# Patient Record
Sex: FEMALE | Race: WHITE | NOT HISPANIC OR LATINO | Employment: OTHER | URBAN - METROPOLITAN AREA
[De-identification: names, ages, dates, MRNs, and addresses within clinical notes are randomized per-mention and may not be internally consistent; named-entity substitution may affect disease eponyms.]

---

## 2023-06-22 LAB — URINE CULTURE: ABNORMAL

## 2024-04-19 ENCOUNTER — HOSPITAL ENCOUNTER (INPATIENT)
Facility: HOSPITAL | Age: 89
LOS: 2 days | Discharge: HOME HEALTH | DRG: 592 | End: 2024-04-24
Attending: STUDENT IN AN ORGANIZED HEALTH CARE EDUCATION/TRAINING PROGRAM | Admitting: INTERNAL MEDICINE
Payer: COMMERCIAL

## 2024-04-19 ENCOUNTER — APPOINTMENT (OUTPATIENT)
Dept: RADIOLOGY | Facility: HOSPITAL | Age: 89
DRG: 592 | End: 2024-04-19
Payer: COMMERCIAL

## 2024-04-19 DIAGNOSIS — L97.521 NON-PRESSURE CHRONIC ULCER OF OTHER PART OF LEFT FOOT LIMITED TO BREAKDOWN OF SKIN (MULTI): ICD-10-CM

## 2024-04-19 DIAGNOSIS — I50.1: ICD-10-CM

## 2024-04-19 DIAGNOSIS — J81.0 ACUTE PULMONARY EDEMA (MULTI): ICD-10-CM

## 2024-04-19 DIAGNOSIS — J90 PLEURAL EFFUSION, NOT ELSEWHERE CLASSIFIED: ICD-10-CM

## 2024-04-19 DIAGNOSIS — L03.119 CELLULITIS OF FOOT: Primary | ICD-10-CM

## 2024-04-19 DIAGNOSIS — R79.89 ELEVATED BRAIN NATRIURETIC PEPTIDE (BNP) LEVEL: ICD-10-CM

## 2024-04-19 LAB
ALBUMIN SERPL BCP-MCNC: 2.4 G/DL (ref 3.4–5)
ALP SERPL-CCNC: 130 U/L (ref 33–136)
ALT SERPL W P-5'-P-CCNC: 16 U/L (ref 7–45)
ANION GAP SERPL CALC-SCNC: 15 MMOL/L (ref 10–20)
AST SERPL W P-5'-P-CCNC: 25 U/L (ref 9–39)
BASOPHILS # BLD AUTO: 0.03 X10*3/UL (ref 0–0.1)
BASOPHILS NFR BLD AUTO: 0.2 %
BILIRUB SERPL-MCNC: 1.2 MG/DL (ref 0–1.2)
BNP SERPL-MCNC: 1113 PG/ML (ref 0–99)
BUN SERPL-MCNC: 39 MG/DL (ref 6–23)
CALCIUM SERPL-MCNC: 8.3 MG/DL (ref 8.6–10.3)
CHLORIDE SERPL-SCNC: 109 MMOL/L (ref 98–107)
CO2 SERPL-SCNC: 19 MMOL/L (ref 21–32)
CREAT SERPL-MCNC: 1.86 MG/DL (ref 0.5–1.05)
CRP SERPL-MCNC: 8.74 MG/DL
EGFRCR SERPLBLD CKD-EPI 2021: 25 ML/MIN/1.73M*2
EOSINOPHIL # BLD AUTO: 0.03 X10*3/UL (ref 0–0.4)
EOSINOPHIL NFR BLD AUTO: 0.2 %
ERYTHROCYTE [DISTWIDTH] IN BLOOD BY AUTOMATED COUNT: 14.7 % (ref 11.5–14.5)
ERYTHROCYTE [SEDIMENTATION RATE] IN BLOOD BY WESTERGREN METHOD: 18 MM/H (ref 0–30)
GLUCOSE SERPL-MCNC: 92 MG/DL (ref 74–99)
HCT VFR BLD AUTO: 44.2 % (ref 36–46)
HGB BLD-MCNC: 13.9 G/DL (ref 12–16)
IMM GRANULOCYTES # BLD AUTO: 0.14 X10*3/UL (ref 0–0.5)
IMM GRANULOCYTES NFR BLD AUTO: 0.9 % (ref 0–0.9)
LACTATE SERPL-SCNC: 2.5 MMOL/L (ref 0.4–2)
LACTATE SERPL-SCNC: 3.4 MMOL/L (ref 0.4–2)
LYMPHOCYTES # BLD AUTO: 1.56 X10*3/UL (ref 0.8–3)
LYMPHOCYTES NFR BLD AUTO: 10.4 %
MCH RBC QN AUTO: 29.1 PG (ref 26–34)
MCHC RBC AUTO-ENTMCNC: 31.4 G/DL (ref 32–36)
MCV RBC AUTO: 93 FL (ref 80–100)
MONOCYTES # BLD AUTO: 1.38 X10*3/UL (ref 0.05–0.8)
MONOCYTES NFR BLD AUTO: 9.2 %
NEUTROPHILS # BLD AUTO: 11.86 X10*3/UL (ref 1.6–5.5)
NEUTROPHILS NFR BLD AUTO: 79.1 %
NRBC BLD-RTO: 0 /100 WBCS (ref 0–0)
PLATELET # BLD AUTO: 160 X10*3/UL (ref 150–450)
POTASSIUM SERPL-SCNC: 4.2 MMOL/L (ref 3.5–5.3)
PROT SERPL-MCNC: 6.3 G/DL (ref 6.4–8.2)
RBC # BLD AUTO: 4.77 X10*6/UL (ref 4–5.2)
SODIUM SERPL-SCNC: 139 MMOL/L (ref 136–145)
WBC # BLD AUTO: 15 X10*3/UL (ref 4.4–11.3)

## 2024-04-19 PROCEDURE — 73630 X-RAY EXAM OF FOOT: CPT | Mod: 50

## 2024-04-19 PROCEDURE — 87040 BLOOD CULTURE FOR BACTERIA: CPT | Mod: PARLAB | Performed by: PHYSICIAN ASSISTANT

## 2024-04-19 PROCEDURE — 73630 X-RAY EXAM OF FOOT: CPT | Mod: BILATERAL PROCEDURE | Performed by: RADIOLOGY

## 2024-04-19 PROCEDURE — 86140 C-REACTIVE PROTEIN: CPT | Performed by: PHYSICIAN ASSISTANT

## 2024-04-19 PROCEDURE — 99221 1ST HOSP IP/OBS SF/LOW 40: CPT | Performed by: NURSE PRACTITIONER

## 2024-04-19 PROCEDURE — 85025 COMPLETE CBC W/AUTO DIFF WBC: CPT | Performed by: PHYSICIAN ASSISTANT

## 2024-04-19 PROCEDURE — 2500000004 HC RX 250 GENERAL PHARMACY W/ HCPCS (ALT 636 FOR OP/ED): Performed by: PHYSICIAN ASSISTANT

## 2024-04-19 PROCEDURE — 96367 TX/PROPH/DG ADDL SEQ IV INF: CPT

## 2024-04-19 PROCEDURE — G0378 HOSPITAL OBSERVATION PER HR: HCPCS

## 2024-04-19 PROCEDURE — 85652 RBC SED RATE AUTOMATED: CPT | Performed by: PHYSICIAN ASSISTANT

## 2024-04-19 PROCEDURE — 2500000004 HC RX 250 GENERAL PHARMACY W/ HCPCS (ALT 636 FOR OP/ED): Performed by: STUDENT IN AN ORGANIZED HEALTH CARE EDUCATION/TRAINING PROGRAM

## 2024-04-19 PROCEDURE — 83605 ASSAY OF LACTIC ACID: CPT | Performed by: PHYSICIAN ASSISTANT

## 2024-04-19 PROCEDURE — 36415 COLL VENOUS BLD VENIPUNCTURE: CPT | Performed by: PHYSICIAN ASSISTANT

## 2024-04-19 PROCEDURE — 83880 ASSAY OF NATRIURETIC PEPTIDE: CPT | Performed by: NURSE PRACTITIONER

## 2024-04-19 PROCEDURE — 99285 EMERGENCY DEPT VISIT HI MDM: CPT

## 2024-04-19 PROCEDURE — 96365 THER/PROPH/DIAG IV INF INIT: CPT

## 2024-04-19 PROCEDURE — 80053 COMPREHEN METABOLIC PANEL: CPT | Performed by: PHYSICIAN ASSISTANT

## 2024-04-19 RX ORDER — VANCOMYCIN HYDROCHLORIDE 1 G/20ML
INJECTION, POWDER, LYOPHILIZED, FOR SOLUTION INTRAVENOUS DAILY PRN
Status: DISCONTINUED | OUTPATIENT
Start: 2024-04-19 | End: 2024-04-19

## 2024-04-19 RX ORDER — VANCOMYCIN HYDROCHLORIDE 500 MG/100ML
500 INJECTION, SOLUTION INTRAVENOUS ONCE
Status: COMPLETED | OUTPATIENT
Start: 2024-04-19 | End: 2024-04-19

## 2024-04-19 RX ORDER — DEXTROSE 50 % IN WATER (D50W) INTRAVENOUS SYRINGE
25
Status: DISCONTINUED | OUTPATIENT
Start: 2024-04-19 | End: 2024-04-24 | Stop reason: HOSPADM

## 2024-04-19 RX ORDER — ACETAMINOPHEN 325 MG/1
650 TABLET ORAL EVERY 4 HOURS PRN
Status: DISCONTINUED | OUTPATIENT
Start: 2024-04-19 | End: 2024-04-24 | Stop reason: HOSPADM

## 2024-04-19 RX ORDER — DEXTROSE 50 % IN WATER (D50W) INTRAVENOUS SYRINGE
12.5
Status: DISCONTINUED | OUTPATIENT
Start: 2024-04-19 | End: 2024-04-24 | Stop reason: HOSPADM

## 2024-04-19 RX ORDER — VANCOMYCIN HYDROCHLORIDE 1 G/20ML
INJECTION, POWDER, LYOPHILIZED, FOR SOLUTION INTRAVENOUS DAILY PRN
Status: DISCONTINUED | OUTPATIENT
Start: 2024-04-19 | End: 2024-04-24 | Stop reason: HOSPADM

## 2024-04-19 RX ORDER — SODIUM CHLORIDE, SODIUM LACTATE, POTASSIUM CHLORIDE, CALCIUM CHLORIDE 600; 310; 30; 20 MG/100ML; MG/100ML; MG/100ML; MG/100ML
50 INJECTION, SOLUTION INTRAVENOUS CONTINUOUS
Status: ACTIVE | OUTPATIENT
Start: 2024-04-19 | End: 2024-04-20

## 2024-04-19 RX ORDER — ACETAMINOPHEN 325 MG/1
650 TABLET ORAL ONCE
Status: COMPLETED | OUTPATIENT
Start: 2024-04-19 | End: 2024-04-19

## 2024-04-19 RX ADMIN — ACETAMINOPHEN 650 MG: 325 TABLET ORAL at 19:47

## 2024-04-19 RX ADMIN — SODIUM CHLORIDE 500 ML: 9 INJECTION, SOLUTION INTRAVENOUS at 19:49

## 2024-04-19 RX ADMIN — PIPERACILLIN SODIUM AND TAZOBACTAM SODIUM 3.38 G: 3; .375 INJECTION, SOLUTION INTRAVENOUS at 19:46

## 2024-04-19 RX ADMIN — VANCOMYCIN HYDROCHLORIDE 500 MG: 500 INJECTION, SOLUTION INTRAVENOUS at 20:56

## 2024-04-19 ASSESSMENT — PAIN DESCRIPTION - LOCATION: LOCATION: FOOT

## 2024-04-19 ASSESSMENT — PAIN SCALES - GENERAL: PAINLEVEL_OUTOF10: 2

## 2024-04-19 ASSESSMENT — COLUMBIA-SUICIDE SEVERITY RATING SCALE - C-SSRS
1. IN THE PAST MONTH, HAVE YOU WISHED YOU WERE DEAD OR WISHED YOU COULD GO TO SLEEP AND NOT WAKE UP?: NO
2. HAVE YOU ACTUALLY HAD ANY THOUGHTS OF KILLING YOURSELF?: NO
6. HAVE YOU EVER DONE ANYTHING, STARTED TO DO ANYTHING, OR PREPARED TO DO ANYTHING TO END YOUR LIFE?: NO

## 2024-04-19 ASSESSMENT — PAIN - FUNCTIONAL ASSESSMENT: PAIN_FUNCTIONAL_ASSESSMENT: 0-10

## 2024-04-19 NOTE — PROGRESS NOTES
Vancomycin Dosing by Pharmacy- EMERGENCY DEPARTMENT    Joann Blank is a 89 y.o. year old female who Pharmacy has been consulted to give a ONE TIME ONLY vancomycin dose in the Emergency Department for cellulitis, skin and soft tissue.     Visit Vitals  /83   Pulse 64   Temp 35.8 °C (96.4 °F)   Resp 18      Lab Results   Component Value Date    CREATININE 0.92 09/30/2021    CREATININE 0.75 02/06/2019      Wt Readings from Last 1 Encounters:   04/19/24 (!) 36.3 kg (80 lb)        Assessment/Plan     Patient will be given a one time dose of 500 mg  Pharmacy will sign off at this time. If vancomycin is to be continued, please re-consult Pharmacy.       Joycelyn Reddy, SunnyD

## 2024-04-19 NOTE — ED PROVIDER NOTES
HPI   Chief Complaint   Patient presents with    Wound Check       HPI This is a 89-year-old female who does not speak any English who is here with her son and granddaughter presenting with foot issues.  They noted the last couple weeks she had a sore on the base of the fifth metatarsal aspect which they have been applying some topical gauze and ointment.  They noticed this more darkening and was concerned.  That she also has a small lesion on her right heel.  Her foot appears more reddened today did not appear red and previously.  She also has another lesion on her left foot near the first metatarsal very small darkened area noted she also.  They became concerned and brought here for follow-up.  They said she had seen her primary doctor about a month ago without any of these.  She does have a history of hypertension and is not on any other medicine no blood thinners no cough cold fever numbness tingling weakness.                    Marianna Coma Scale Score: 15                     Patient History   Past Medical History:   Diagnosis Date    Personal history of other diseases of the circulatory system     History of hypertension     No past surgical history on file.  No family history on file.  Social History     Tobacco Use    Smoking status: Not on file    Smokeless tobacco: Not on file   Substance Use Topics    Alcohol use: Not on file    Drug use: Not on file       Physical Exam   ED Triage Vitals [04/19/24 1813]   Temperature Heart Rate Respirations BP   35.8 °C (96.4 °F) 64 18 169/83      Pulse Ox Temp src Heart Rate Source Patient Position   94 % -- -- --      BP Location FiO2 (%)     -- --       Physical Exam    Reviewed family history social history and allergies and were noncontributory to current problem.    Review of systems as noted in history of present illness  otherwise negative. All other systems were reviewed and negative.     PMHX: Chronic conditions: reviewd in EMR, relevant history noted in HPI                 Surgeries, hospitalizations: reviewed in EMR , relevant history noted in HPI                Medications: reviewed in EMR, relevant history noted in HPI                Allergies: reviewed in EMR, relevant history noted in HPI      PHYSICAL EXAM:    GENERAL/ CONSTITUTIONAL: Vitals noted, no distress. Alert and oriented  x 3. Non-toxic.  No Drooling or stridor .    HEAD: Normocephalic Atraumatic    EENT: TMs clear. Posterior oropharynx unremarkable. No meningismus. No LAD.  No exudate present.      EYES: PERRLA EOMI     NECK: Supple. Nontender. No midline tenderness.  Full range of motion    CARDIAC: Regular, rate, rhythm. No murmurs rubs or gallops. No JVD    PULMONARY: Lungs clear bilaterally with good aeration. No wheezes rales or rhonchi. No respiratory distress.     GI: Soft, . Nontender. No peritoneal signs. Normoactive bowel sounds. No pulsatile masses.  No guarding or rebound    EXTREMITIES/MUSCULOSKELTAL: Upon inspection of right foot there is some redness noted on the dorsal aspect .  Dopplerable pulses.  There is a lesion measuring approximately 2 cm x 1.5 cm along the right lateral foot near the base of the fifth toe .  Darkened.  There is some superficial dressing noted adhered to the area.  It appears blackened.  There is also a heel decubiti noted .    Inspection of the left foot there is a small area of darkened skin near the base of the first metatarsal measuring approximately 0.5 cm in diameter.  Is no increased redness pulses are intact.    SKIN: No rash. Intact.     NEURO: No focal neurologic deficits,     PSYCH: appropriate mood and affect    MEDICAL DECISION MAKING:    ED Course & MDM      Patient has workup for infection and gangrene.  Blood work performed .  Antibiotics ordered. Admission sought and workup will be completed by DR VALLE, attending  Medical Decision Making    Admission sought and workup will be completed by DR VALLE, attending  Procedure  Procedures     Sana ROUSSEAU  TERI Ojeda  04/19/24 1938

## 2024-04-19 NOTE — ED TRIAGE NOTES
Pt brought to ER by family. Has wound on right foot. Now having some black skin noted. Pt has hx of dementia per family

## 2024-04-20 ENCOUNTER — APPOINTMENT (OUTPATIENT)
Dept: RADIOLOGY | Facility: HOSPITAL | Age: 89
DRG: 592 | End: 2024-04-20
Payer: COMMERCIAL

## 2024-04-20 LAB
AMORPH CRY #/AREA UR COMP ASSIST: ABNORMAL /HPF
ANION GAP SERPL CALC-SCNC: 13 MMOL/L (ref 10–20)
APPEARANCE UR: ABNORMAL
BACTERIA #/AREA URNS AUTO: ABNORMAL /HPF
BILIRUB UR STRIP.AUTO-MCNC: NEGATIVE MG/DL
BUN SERPL-MCNC: 41 MG/DL (ref 6–23)
CALCIUM SERPL-MCNC: 7.6 MG/DL (ref 8.6–10.3)
CHLORIDE SERPL-SCNC: 111 MMOL/L (ref 98–107)
CHLORIDE UR-SCNC: 45 MMOL/L
CHLORIDE/CREATININE (MMOL/G) IN URINE: 48 MMOL/G CREAT (ref 38–318)
CO2 SERPL-SCNC: 20 MMOL/L (ref 21–32)
COLOR UR: ABNORMAL
CREAT SERPL-MCNC: 1.81 MG/DL (ref 0.5–1.05)
CREAT UR-MCNC: 93.1 MG/DL (ref 20–320)
CREAT UR-MCNC: 93.1 MG/DL (ref 20–320)
EGFRCR SERPLBLD CKD-EPI 2021: 26 ML/MIN/1.73M*2
ERYTHROCYTE [DISTWIDTH] IN BLOOD BY AUTOMATED COUNT: 14.8 % (ref 11.5–14.5)
GLUCOSE BLD MANUAL STRIP-MCNC: 101 MG/DL (ref 74–99)
GLUCOSE BLD MANUAL STRIP-MCNC: 74 MG/DL (ref 74–99)
GLUCOSE BLD MANUAL STRIP-MCNC: 89 MG/DL (ref 74–99)
GLUCOSE SERPL-MCNC: 102 MG/DL (ref 74–99)
GLUCOSE UR STRIP.AUTO-MCNC: NEGATIVE MG/DL
HCT VFR BLD AUTO: 39.3 % (ref 36–46)
HGB BLD-MCNC: 12.2 G/DL (ref 12–16)
KETONES UR STRIP.AUTO-MCNC: NEGATIVE MG/DL
LEUKOCYTE ESTERASE UR QL STRIP.AUTO: ABNORMAL
MCH RBC QN AUTO: 29 PG (ref 26–34)
MCHC RBC AUTO-ENTMCNC: 31 G/DL (ref 32–36)
MCV RBC AUTO: 94 FL (ref 80–100)
MICROALBUMIN UR-MCNC: 94.3 MG/L
MICROALBUMIN/CREAT UR: 101.3 UG/MG CREAT
NITRITE UR QL STRIP.AUTO: NEGATIVE
NRBC BLD-RTO: 0 /100 WBCS (ref 0–0)
PH UR STRIP.AUTO: 5 [PH]
PLATELET # BLD AUTO: 127 X10*3/UL (ref 150–450)
POTASSIUM SERPL-SCNC: 4.1 MMOL/L (ref 3.5–5.3)
POTASSIUM UR-SCNC: 62 MMOL/L
POTASSIUM/CREAT UR-RTO: 67 MMOL/G CREAT
PROT UR STRIP.AUTO-MCNC: ABNORMAL MG/DL
RBC # BLD AUTO: 4.2 X10*6/UL (ref 4–5.2)
RBC # UR STRIP.AUTO: ABNORMAL /UL
RBC #/AREA URNS AUTO: ABNORMAL /HPF
SODIUM SERPL-SCNC: 140 MMOL/L (ref 136–145)
SODIUM UR-SCNC: 62 MMOL/L
SODIUM/CREAT UR-RTO: 67 MMOL/G CREAT
SP GR UR STRIP.AUTO: 1.02
SQUAMOUS #/AREA URNS AUTO: ABNORMAL /HPF
UROBILINOGEN UR STRIP.AUTO-MCNC: 2 MG/DL
VANCOMYCIN TROUGH SERPL-MCNC: 5.9 UG/ML (ref 5–20)
WBC # BLD AUTO: 9.8 X10*3/UL (ref 4.4–11.3)
WBC #/AREA URNS AUTO: >50 /HPF

## 2024-04-20 PROCEDURE — 36415 COLL VENOUS BLD VENIPUNCTURE: CPT | Performed by: INTERNAL MEDICINE

## 2024-04-20 PROCEDURE — 36415 COLL VENOUS BLD VENIPUNCTURE: CPT | Performed by: NURSE PRACTITIONER

## 2024-04-20 PROCEDURE — 82043 UR ALBUMIN QUANTITATIVE: CPT | Performed by: INTERNAL MEDICINE

## 2024-04-20 PROCEDURE — 82436 ASSAY OF URINE CHLORIDE: CPT | Performed by: INTERNAL MEDICINE

## 2024-04-20 PROCEDURE — 87449 NOS EACH ORGANISM AG IA: CPT | Mod: PARLAB | Performed by: NURSE PRACTITIONER

## 2024-04-20 PROCEDURE — 71045 X-RAY EXAM CHEST 1 VIEW: CPT | Performed by: RADIOLOGY

## 2024-04-20 PROCEDURE — 99222 1ST HOSP IP/OBS MODERATE 55: CPT | Performed by: INTERNAL MEDICINE

## 2024-04-20 PROCEDURE — 83970 ASSAY OF PARATHORMONE: CPT | Mod: PARLAB | Performed by: INTERNAL MEDICINE

## 2024-04-20 PROCEDURE — 2500000004 HC RX 250 GENERAL PHARMACY W/ HCPCS (ALT 636 FOR OP/ED): Performed by: NURSE PRACTITIONER

## 2024-04-20 PROCEDURE — 87899 AGENT NOS ASSAY W/OPTIC: CPT | Mod: PARLAB | Performed by: NURSE PRACTITIONER

## 2024-04-20 PROCEDURE — 97161 PT EVAL LOW COMPLEX 20 MIN: CPT | Mod: GP

## 2024-04-20 PROCEDURE — 81001 URINALYSIS AUTO W/SCOPE: CPT | Performed by: NURSE PRACTITIONER

## 2024-04-20 PROCEDURE — 80202 ASSAY OF VANCOMYCIN: CPT | Performed by: NURSE PRACTITIONER

## 2024-04-20 PROCEDURE — 76770 US EXAM ABDO BACK WALL COMP: CPT

## 2024-04-20 PROCEDURE — 73718 MRI LOWER EXTREMITY W/O DYE: CPT | Mod: RT

## 2024-04-20 PROCEDURE — G0378 HOSPITAL OBSERVATION PER HR: HCPCS

## 2024-04-20 PROCEDURE — 80048 BASIC METABOLIC PNL TOTAL CA: CPT | Performed by: NURSE PRACTITIONER

## 2024-04-20 PROCEDURE — 85027 COMPLETE CBC AUTOMATED: CPT | Performed by: NURSE PRACTITIONER

## 2024-04-20 PROCEDURE — 76770 US EXAM ABDO BACK WALL COMP: CPT | Performed by: RADIOLOGY

## 2024-04-20 PROCEDURE — 71045 X-RAY EXAM CHEST 1 VIEW: CPT

## 2024-04-20 PROCEDURE — 97165 OT EVAL LOW COMPLEX 30 MIN: CPT | Mod: GO

## 2024-04-20 PROCEDURE — 82947 ASSAY GLUCOSE BLOOD QUANT: CPT

## 2024-04-20 PROCEDURE — 87086 URINE CULTURE/COLONY COUNT: CPT | Mod: PARLAB | Performed by: INTERNAL MEDICINE

## 2024-04-20 RX ORDER — POLYETHYLENE GLYCOL 3350 17 G/17G
17 POWDER, FOR SOLUTION ORAL DAILY PRN
Status: DISCONTINUED | OUTPATIENT
Start: 2024-04-20 | End: 2024-04-24 | Stop reason: HOSPADM

## 2024-04-20 RX ORDER — VANCOMYCIN HYDROCHLORIDE 500 MG/100ML
500 INJECTION, SOLUTION INTRAVENOUS ONCE
Status: COMPLETED | OUTPATIENT
Start: 2024-04-20 | End: 2024-04-21

## 2024-04-20 RX ADMIN — PIPERACILLIN SODIUM AND TAZOBACTAM SODIUM 2.25 G: 2; .25 INJECTION, SOLUTION INTRAVENOUS at 14:59

## 2024-04-20 RX ADMIN — PIPERACILLIN SODIUM AND TAZOBACTAM SODIUM 2.25 G: 2; .25 INJECTION, SOLUTION INTRAVENOUS at 20:16

## 2024-04-20 RX ADMIN — PIPERACILLIN SODIUM AND TAZOBACTAM SODIUM 2.25 G: 2; .25 INJECTION, SOLUTION INTRAVENOUS at 09:12

## 2024-04-20 RX ADMIN — PIPERACILLIN SODIUM AND TAZOBACTAM SODIUM 2.25 G: 2; .25 INJECTION, SOLUTION INTRAVENOUS at 03:18

## 2024-04-20 SDOH — HEALTH STABILITY: PHYSICAL HEALTH
ON AVERAGE, HOW MANY DAYS PER WEEK DO YOU ENGAGE IN MODERATE TO STRENUOUS EXERCISE (LIKE A BRISK WALK)?: PATIENT DECLINED

## 2024-04-20 SDOH — SOCIAL STABILITY: SOCIAL INSECURITY: DOES ANYONE TRY TO KEEP YOU FROM HAVING/CONTACTING OTHER FRIENDS OR DOING THINGS OUTSIDE YOUR HOME?: UNABLE TO ASSESS

## 2024-04-20 SDOH — HEALTH STABILITY: MENTAL HEALTH: HOW MANY STANDARD DRINKS CONTAINING ALCOHOL DO YOU HAVE ON A TYPICAL DAY?: PATIENT DECLINED

## 2024-04-20 SDOH — SOCIAL STABILITY: SOCIAL INSECURITY: ABUSE: ADULT

## 2024-04-20 SDOH — HEALTH STABILITY: PHYSICAL HEALTH: ON AVERAGE, HOW MANY MINUTES DO YOU ENGAGE IN EXERCISE AT THIS LEVEL?: PATIENT DECLINED

## 2024-04-20 SDOH — ECONOMIC STABILITY: HOUSING INSECURITY: IN THE LAST 12 MONTHS, HOW MANY PLACES HAVE YOU LIVED?: 1

## 2024-04-20 SDOH — SOCIAL STABILITY: SOCIAL INSECURITY: ARE YOU OR HAVE YOU BEEN THREATENED OR ABUSED PHYSICALLY, EMOTIONALLY, OR SEXUALLY BY ANYONE?: UNABLE TO ASSESS

## 2024-04-20 SDOH — SOCIAL STABILITY: SOCIAL INSECURITY: WERE YOU ABLE TO COMPLETE ALL THE BEHAVIORAL HEALTH SCREENINGS?: YES

## 2024-04-20 SDOH — SOCIAL STABILITY: SOCIAL INSECURITY: DO YOU FEEL ANYONE HAS EXPLOITED OR TAKEN ADVANTAGE OF YOU FINANCIALLY OR OF YOUR PERSONAL PROPERTY?: UNABLE TO ASSESS

## 2024-04-20 SDOH — SOCIAL STABILITY: SOCIAL INSECURITY: HAVE YOU HAD THOUGHTS OF HARMING ANYONE ELSE?: NO

## 2024-04-20 SDOH — HEALTH STABILITY: MENTAL HEALTH: HOW OFTEN DO YOU HAVE 6 OR MORE DRINKS ON ONE OCCASION?: PATIENT DECLINED

## 2024-04-20 SDOH — SOCIAL STABILITY: SOCIAL INSECURITY: DO YOU FEEL UNSAFE GOING BACK TO THE PLACE WHERE YOU ARE LIVING?: UNABLE TO ASSESS

## 2024-04-20 SDOH — SOCIAL STABILITY: SOCIAL INSECURITY: HAVE YOU HAD ANY THOUGHTS OF HARMING ANYONE ELSE?: UNABLE TO ASSESS

## 2024-04-20 SDOH — SOCIAL STABILITY: SOCIAL INSECURITY: HAS ANYONE EVER THREATENED TO HURT YOUR FAMILY OR YOUR PETS?: UNABLE TO ASSESS

## 2024-04-20 SDOH — ECONOMIC STABILITY: HOUSING INSECURITY
IN THE LAST 12 MONTHS, WAS THERE A TIME WHEN YOU DID NOT HAVE A STEADY PLACE TO SLEEP OR SLEPT IN A SHELTER (INCLUDING NOW)?: PATIENT DECLINED

## 2024-04-20 SDOH — HEALTH STABILITY: MENTAL HEALTH: HOW OFTEN DO YOU HAVE A DRINK CONTAINING ALCOHOL?: PATIENT DECLINED

## 2024-04-20 SDOH — SOCIAL STABILITY: SOCIAL INSECURITY: ARE THERE ANY APPARENT SIGNS OF INJURIES/BEHAVIORS THAT COULD BE RELATED TO ABUSE/NEGLECT?: UNABLE TO ASSESS

## 2024-04-20 ASSESSMENT — COGNITIVE AND FUNCTIONAL STATUS - GENERAL
STANDING UP FROM CHAIR USING ARMS: TOTAL
MOBILITY SCORE: 6
TURNING FROM BACK TO SIDE WHILE IN FLAT BAD: TOTAL
HELP NEEDED FOR BATHING: TOTAL
DAILY ACTIVITIY SCORE: 6
TURNING FROM BACK TO SIDE WHILE IN FLAT BAD: TOTAL
DRESSING REGULAR UPPER BODY CLOTHING: TOTAL
PERSONAL GROOMING: TOTAL
MOVING TO AND FROM BED TO CHAIR: TOTAL
CLIMB 3 TO 5 STEPS WITH RAILING: TOTAL
WALKING IN HOSPITAL ROOM: TOTAL
MOBILITY SCORE: 6
DAILY ACTIVITIY SCORE: 6
MOVING TO AND FROM BED TO CHAIR: TOTAL
WALKING IN HOSPITAL ROOM: TOTAL
HELP NEEDED FOR BATHING: TOTAL
TOILETING: TOTAL
DRESSING REGULAR LOWER BODY CLOTHING: TOTAL
EATING MEALS: TOTAL
DRESSING REGULAR LOWER BODY CLOTHING: TOTAL
MOVING FROM LYING ON BACK TO SITTING ON SIDE OF FLAT BED WITH BEDRAILS: TOTAL
TOILETING: TOTAL
DRESSING REGULAR UPPER BODY CLOTHING: TOTAL
PATIENT BASELINE BEDBOUND: NO
HELP NEEDED FOR BATHING: TOTAL
DRESSING REGULAR LOWER BODY CLOTHING: TOTAL
DRESSING REGULAR LOWER BODY CLOTHING: TOTAL
CLIMB 3 TO 5 STEPS WITH RAILING: TOTAL
MOVING FROM LYING ON BACK TO SITTING ON SIDE OF FLAT BED WITH BEDRAILS: TOTAL
DRESSING REGULAR UPPER BODY CLOTHING: TOTAL
HELP NEEDED FOR BATHING: TOTAL
MOBILITY SCORE: 6
MOVING FROM LYING ON BACK TO SITTING ON SIDE OF FLAT BED WITH BEDRAILS: TOTAL
PERSONAL GROOMING: TOTAL
PERSONAL GROOMING: TOTAL
TURNING FROM BACK TO SIDE WHILE IN FLAT BAD: TOTAL
TURNING FROM BACK TO SIDE WHILE IN FLAT BAD: TOTAL
CLIMB 3 TO 5 STEPS WITH RAILING: TOTAL
MOVING TO AND FROM BED TO CHAIR: TOTAL
WALKING IN HOSPITAL ROOM: TOTAL
MOVING TO AND FROM BED TO CHAIR: TOTAL
TOILETING: TOTAL
TOILETING: TOTAL
EATING MEALS: TOTAL
CLIMB 3 TO 5 STEPS WITH RAILING: TOTAL
WALKING IN HOSPITAL ROOM: TOTAL
DAILY ACTIVITIY SCORE: 6
MOVING FROM LYING ON BACK TO SITTING ON SIDE OF FLAT BED WITH BEDRAILS: TOTAL
STANDING UP FROM CHAIR USING ARMS: TOTAL
MOBILITY SCORE: 6
STANDING UP FROM CHAIR USING ARMS: TOTAL
TURNING FROM BACK TO SIDE WHILE IN FLAT BAD: TOTAL
PERSONAL GROOMING: TOTAL
STANDING UP FROM CHAIR USING ARMS: TOTAL
DAILY ACTIVITIY SCORE: 6
MOVING TO AND FROM BED TO CHAIR: TOTAL
MOVING FROM LYING ON BACK TO SITTING ON SIDE OF FLAT BED WITH BEDRAILS: TOTAL
WALKING IN HOSPITAL ROOM: TOTAL
STANDING UP FROM CHAIR USING ARMS: TOTAL
CLIMB 3 TO 5 STEPS WITH RAILING: TOTAL
MOBILITY SCORE: 6
EATING MEALS: TOTAL
EATING MEALS: TOTAL
DRESSING REGULAR UPPER BODY CLOTHING: TOTAL

## 2024-04-20 ASSESSMENT — ACTIVITIES OF DAILY LIVING (ADL)
DRESSING YOURSELF: DEPENDENT
HEARING - LEFT EAR: DIFFICULTY WITH NOISE
JUDGMENT_ADEQUATE_SAFELY_COMPLETE_DAILY_ACTIVITIES: NO
WALKS IN HOME: DEPENDENT
BATHING: DEPENDENT
TOILETING: DEPENDENT
HEARING - RIGHT EAR: DIFFICULTY WITH NOISE
GROOMING: DEPENDENT
PATIENT'S MEMORY ADEQUATE TO SAFELY COMPLETE DAILY ACTIVITIES?: NO
FEEDING YOURSELF: DEPENDENT
LACK_OF_TRANSPORTATION: PATIENT DECLINED
ADEQUATE_TO_COMPLETE_ADL: YES

## 2024-04-20 ASSESSMENT — LIFESTYLE VARIABLES
AUDIT-C TOTAL SCORE: -1
HOW OFTEN DO YOU HAVE A DRINK CONTAINING ALCOHOL: PATIENT DECLINED
SKIP TO QUESTIONS 9-10: 0
AUDIT-C TOTAL SCORE: -1
HOW MANY STANDARD DRINKS CONTAINING ALCOHOL DO YOU HAVE ON A TYPICAL DAY: PATIENT DECLINED
HOW OFTEN DO YOU HAVE 6 OR MORE DRINKS ON ONE OCCASION: PATIENT DECLINED
AUDIT-C TOTAL SCORE: -1
SKIP TO QUESTIONS 9-10: 0

## 2024-04-20 ASSESSMENT — PAIN SCALES - GENERAL
PAINLEVEL_OUTOF10: 0 - NO PAIN

## 2024-04-20 ASSESSMENT — PATIENT HEALTH QUESTIONNAIRE - PHQ9
1. LITTLE INTEREST OR PLEASURE IN DOING THINGS: NOT AT ALL
2. FEELING DOWN, DEPRESSED OR HOPELESS: NOT AT ALL
SUM OF ALL RESPONSES TO PHQ9 QUESTIONS 1 & 2: 0

## 2024-04-20 ASSESSMENT — PAIN - FUNCTIONAL ASSESSMENT
PAIN_FUNCTIONAL_ASSESSMENT: 0-10

## 2024-04-20 NOTE — PROGRESS NOTES
Physical Therapy    Physical Therapy Evaluation    Patient Name: Joann Blank  MRN: [id number]  Today's Date: 4/20/2024   Time Calculation  Start Time: 0944  Stop Time: 1013  Time Calculation (min): 29 min    Assessment/Plan   PT Assessment  Rehab Prognosis: Poor  End of Session Communication: Bedside nurse  Assessment Comment: Pt appears to present near functional baseline. Nonambulatory, full assist for ADL/IADLs. No further acute PT needs. Will d/c from caseload. Recommend continued 24hr care upon d/c. Recommend ino lift transfer. Pt would benefit from podiatry consult. Also recommend palliative consult, per granddtr request. Nursing aware.  End of Session Patient Position: Bed, 2 rail up  IP OR SWING BED PT PLAN  Inpatient or Swing Bed: Inpatient  PT Plan  PT Plan: PT Eval only  PT Eval Only Reason: At baseline function  PT Frequency: PT eval only  PT Discharge Recommendations: No PT needed after discharge (24hr care)    Subjective     Current Problem:  Patient Active Problem List   Diagnosis    Cellulitis of foot       General Visit Information:  General  Reason for Referral: PT eval and treat  Referred By: Ethel  Past Medical History Relevant to Rehab: dementia, HTN  Family/Caregiver Present: Yes  Caregiver Feedback: granddtr present and interpreting  Co-Treatment: OT  Co-Treatment Reason: possible two person assist, maximize pt's functional mobility  Prior to Session Communication: Bedside nurse  Patient Position Received: Bed, 2 rail up, Alarm on  General Comment: Pt presents from home for evaluation of foot wounds and noted new darkened areas on R foot. Wound nurse consulted. Pt cleared for therapy by nursing.    Home Living:  Home Living  Home Living Comments: History obtained from ailinr: Pt lives /c her children who are her caretakers. Split level home.    Prior Level of Function:  Prior Function Per Pt/Caregiver Report  Prior Function Comments: Pt sleeps in a regular bed. Son carries pt from  bed-chair. Pt uses diapers. Family completes all ADL/IADLs. At times uses BSC.    Precautions:  Precautions  Precautions Comment: nonambulatory, non-English speaking       Objective     Pain:  Pain Assessment  Pain Assessment: 0-10  Pain Score: 0 - No pain    Cognition:  Cognition  Overall Cognitive Status:  (difficult to assess 2nd language barrier;follows simple commands /c increased VC/tactile cues and granddtr interpreting)    General Assessments:  General Observation  General Observation: activity orders: OOB /c A skin integrity: R foot/heel wounds/blackened areas; other exposed skin intact/frail   Activity Tolerance  Endurance: Decreased tolerance for upright activites     Strength  Strength Comments:  (minimal active movement BLE, PROM ~75% WFL)             Functional Assessments:     Bed Mobility  Bed Mobility: Yes  Bed Mobility 1  Bed Mobility Comments 1: Rolling/repositioning /c dependent Ax2 and nelson pad. Heels remained off loaded end of session. Pt remains lethargic, n/a to attempt further mobility. Non ambulatory at baseline.             Outcome Measures:  [facility] Basic Mobility  Turning from your back to your side while in a flat bed without using bedrails: Total  Moving from lying on your back to sitting on the side of a flat bed without using bedrails: Total  Moving to and from bed to chair (including a wheelchair): Total  Standing up from a chair using your arms (e.g. wheelchair or bedside chair): Total  To walk in hospital room: Total  Climbing 3-5 steps with railing: Total  Basic Mobility - Total Score: 6

## 2024-04-20 NOTE — CONSULTS
Consults    Reason For Consult  RLE pressure ulcerations    History Of Present Illness  Joann Blank is a 89 y.o. female presenting with right foot pressure ulcerations. Pt was asleep during visitations, granddaughter in room and acted as . Pt reports that Pt has had hip fx now healed leading to decreased activity and more stationary lifestyle. Family said roughly a week ago they noticed the start of the right foot pressure ulcerations, at one point it started to open. The family treated with gauze and collagen. The wound closed but over the past week the pressure areas turned into a large black area.      Past Medical History  She has a past medical history of Personal history of other diseases of the circulatory system.    Surgical History  She has no past surgical history on file.     Social History  She reports that she has never smoked. She has never been exposed to tobacco smoke. She has never used smokeless tobacco. No history on file for alcohol use and drug use.    Family History  No family history on file.     Allergies  Patient has no known allergies.    Review of Systems    REVIEW OF SYSTEMS  GENERAL:  Negative for malaise, significant weight loss, fever  HEENT:  No changes in hearing or vision, no nose bleeds or other nasal problems and Negative for frequent or significant headaches  NECK:  Negative for lumps, goiter, pain and significant neck swelling  RESPIRATORY:  Negative for cough, wheezing and shortness of breath  CARDIOVASCULAR:  Negative for chest pain, leg swelling and palpitations  GI:  Negative for abdominal discomfort, blood in stools or black stools and change in bowel habits  :  Negative for dysuria, frequency and incontinence  MUSCULOSKELETAL:  Pain in right foot   SKIN:  Pressure wound right foot   PSYCH:  Negative for sleep disturbance, mood disorder and recent psychosocial stressors  HEMATOLOGY/LYMPHOLOGY:  Negative for prolonged bleeding, bruising easily, and swollen  "nodes.  ENDOCRINE:  Negative for cold or heat intolerance, polyuria, polydipsia and goiter  NEURO: Negative, denies any burning, tingling or numbness     Objective:   Vasc: DP and PT pulses are faintly bilateral.  CFT is less than 3 seconds bilateral.  Skin temperature is warm to cool proximal to distal bilateral.  erythema to the dorsum of the foot     Neuro:  Light touch is intact to the foot bilateral.  Protective sensation is intact to the foot when tested with the 5.07 SWM bilateral.  There is no clonus noted.  The hallux is downgoing bilateral.      Derm: Skin is supple with normal texture and turgor noted.  Webspaces are clean, dry and intact bilateral.  There are no hyperkeratoses, ulcerations, verruca or other lesions noted.  Pressure ulceration of right heel with necrotic cap that is well adhered, no fluctuance underneath. Periwound erythema. Pressure ulceration of right lateral 5th toe and metatarsal area with necrotic cap that is well adhered with no fluctuance. Periwound erythema noted. Left calf has start of eccymosis.     Ortho: Muscle strength is 5/5 for all pedal groups tested.  Ankle joint, subtalar joint, 1st MPJ and lesser MPJ ROM is full and without pain or crepitus.  The foot type is rectus bilateral off weight bearing.  There are no structural deformities noted.       Physical Exam     Last Recorded Vitals  Blood pressure 143/81, pulse 61, temperature 36.1 °C (97 °F), temperature source Temporal, resp. rate 16, height 1.575 m (5' 2\"), weight (!) 36.3 kg (80 lb), SpO2 97%.    Relevant Results  WBC downtrending, 9.8  Lactate down trending 2.5  Elevated CRP, normal ESR  XR showed no osseous involvement, though soft tissue focal gas on lateral right pressure area consistent with ulcer     Assessment/Plan   # Unstageable pressure ulceration, right heel  # Unstageable pressure ulceration, right lateral foot  # unstageable pressuer ulceration, left heel   # DTI left calf  # Cellulitis, right foot  # " PAD    -Patient was seen and evaluated; all findings were discussed and all questions were answered to patient's satisfaction.  - Charts, labs, vitals and imaging all reviewed.   - Imaging: reviewed   - Labs: reviewed  - PVRs: pending   - Abx:  vanc/zosyn    RECOMMENDATIONS:   - Continue with IV abx per primary, responding well  - Prevlon off loading boots ordered, need to offload legs and pressured areas  - PVRs ordered to assess possible underlying vascular disease  - MRI right foot ordered for XR findings and evaluation of tissue under necrotic cap  - Currently no surgical intervention, due to family expectations and age, most likely will not elect surgery even if MRI is positive, but will revisit pending results.  - Planned conservative treatment with daily dressing changes of betadine to keep wound dry and stable  - Dressings: betadine wtd  - Nursing staff is able to change/reinforce dressing if & as necessary until next day’s dressing change. Thank you.  - will continue following while in house     Case to be discussed with attending, A&P above reflect tentative plan. Please await for final signature from attending physician on service.    Sonu Kline DPM PGY-1  Podiatric Medicine & Surgery  [address]

## 2024-04-20 NOTE — H&P
History Of Present Illness  Joann Blank is a 89 y.o. female presenting to emergency department for evaluation of a wound on the base of the fifth metatarsal.  The patient is Kittitian speaking and presents with her son and granddaughter.  They state that they have been applying some topical gauze and ointment.  Patient also has a small lesion on her right heel and another lesion on her left foot near the first metatarsal.  Family became concerned so they brought her in for evaluation.  They state that the patient has seen her PCP approximately 1 month ago and did not have these issues.    In ED, CRP 8.74, chloride 109, bicarb 19, BUN 39, creatinine 1.86, GFR 25, calcium 8.3, lactate 3.4 with a repeat of 2.5.  WBCs 15.0.  X-ray completed showing no osteomyelitis per radiology review.  Patient medicated with Tylenol.  Started on IV Zosyn and vancomycin.  Patient given 500 mL normal saline bolus.  Blood pressure 144/80, heart rate 69, respirations 16, temperature 36.0 °C, SpO2 94% on room air.  Urinalysis pending, BMP pending.  Consult placed to wound RN.  Patient admitted under the care of Dr. Morris who will continue to follow.  I was asked to do H&P and place initial admission orders.     Past Medical History  Dementia, glaucoma, hypertension, osteoporosis      Surgical History  No past surgical history     Social History  Never smoker, no drug use, no alcohol use    Family History  Noncontributory     Allergies  NKDA/NKA    Review of Systems   Unable to perform ROS: Other        Physical Exam  HENT:      Mouth/Throat:      Mouth: Mucous membranes are dry.      Pharynx: Oropharynx is clear.   Eyes:      Pupils: Pupils are equal, round, and reactive to light.   Cardiovascular:      Rate and Rhythm: Normal rate and regular rhythm.   Pulmonary:      Effort: Pulmonary effort is normal.   Abdominal:      General: Bowel sounds are normal.      Palpations: Abdomen is soft.   Musculoskeletal:      Cervical back: Normal  "range of motion.      Comments: Right base fifth toe lesion/heel ulcer  Redness at the base of the first metatarsal on the left foot   Skin:     General: Skin is warm and dry.   Neurological:      Mental Status: She is alert and oriented to person, place, and time. Mental status is at baseline.   Psychiatric:         Mood and Affect: Mood normal.         Behavior: Behavior normal.           Last Recorded Vitals  Blood pressure 159/79, pulse 76, temperature 36.1 °C (97 °F), temperature source Temporal, resp. rate 16, height 1.575 m (5' 2\"), weight (!) 36.3 kg (80 lb), SpO2 94%.    Relevant Results  XR foot 3+ views bilateral    Result Date: 4/19/2024  Interpreted By:  Carol Briones, STUDY: XR FOOT 3+ VIEWS BILATERAL; ;  4/19/2024 7:13 pm   INDICATION: Signs/Symptoms:foot pain and blackened 5th toe area.   COMPARISON: None.   ACCESSION NUMBER(S): [id number]   ORDERING CLINICIAN: ROOSEVELT BEDOLLA   FINDINGS: Three views left foot: There is osteopenia. No acute fracture or malalignment. Mild midfoot degenerative changes. Mild diffuse soft tissue edema. No soft tissue gas or radiopaque foreign body.   Three views right foot: There is osteopenia. No acute fracture or malalignment. No osseous destruction or abnormal periosteal bone formation. Hallux valgus deformity and mild 1st metatarsophalangeal osteoarthrosis are noted. There is focal soft tissue gas involving the lateral aspect of the base of the 5th toe, suggesting ulceration. No radiopaque foreign body. Diffuse soft tissue edema noted.       No acute osseous abnormality.   Focal soft tissue gas involving the lateral base of the 5th toe suggesting ulceration. No radiographic evidence of osteomyelitis.   Additional chronic findings as above.   MACRO: None   Signed by: Carol Briones 4/19/2024 7:34 PM Dictation workstation:   [id number]   Results for orders placed or performed during the hospital encounter of 04/19/24 (from the past 24 hour(s))   CBC and Auto " Differential   Result Value Ref Range    WBC 15.0 (H) 4.4 - 11.3 x10*3/uL    nRBC 0.0 0.0 - 0.0 /100 WBCs    RBC 4.77 4.00 - 5.20 x10*6/uL    Hemoglobin 13.9 12.0 - 16.0 g/dL    Hematocrit 44.2 36.0 - 46.0 %    MCV 93 80 - 100 fL    MCH 29.1 26.0 - 34.0 pg    MCHC 31.4 (L) 32.0 - 36.0 g/dL    RDW 14.7 (H) 11.5 - 14.5 %    Platelets 160 150 - 450 x10*3/uL    Neutrophils % 79.1 40.0 - 80.0 %    Immature Granulocytes %, Automated 0.9 0.0 - 0.9 %    Lymphocytes % 10.4 13.0 - 44.0 %    Monocytes % 9.2 2.0 - 10.0 %    Eosinophils % 0.2 0.0 - 6.0 %    Basophils % 0.2 0.0 - 2.0 %    Neutrophils Absolute 11.86 (H) 1.60 - 5.50 x10*3/uL    Immature Granulocytes Absolute, Automated 0.14 0.00 - 0.50 x10*3/uL    Lymphocytes Absolute 1.56 0.80 - 3.00 x10*3/uL    Monocytes Absolute 1.38 (H) 0.05 - 0.80 x10*3/uL    Eosinophils Absolute 0.03 0.00 - 0.40 x10*3/uL    Basophils Absolute 0.03 0.00 - 0.10 x10*3/uL   Comprehensive metabolic panel   Result Value Ref Range    Glucose 92 74 - 99 mg/dL    Sodium 139 136 - 145 mmol/L    Potassium 4.2 3.5 - 5.3 mmol/L    Chloride 109 (H) 98 - 107 mmol/L    Bicarbonate 19 (L) 21 - 32 mmol/L    Anion Gap 15 10 - 20 mmol/L    Urea Nitrogen 39 (H) 6 - 23 mg/dL    Creatinine 1.86 (H) 0.50 - 1.05 mg/dL    eGFR 25 (L) >60 mL/min/1.73m*2    Calcium 8.3 (L) 8.6 - 10.3 mg/dL    Albumin 2.4 (L) 3.4 - 5.0 g/dL    Alkaline Phosphatase 130 33 - 136 U/L    Total Protein 6.3 (L) 6.4 - 8.2 g/dL    AST 25 9 - 39 U/L    Bilirubin, Total 1.2 0.0 - 1.2 mg/dL    ALT 16 7 - 45 U/L   Lactate   Result Value Ref Range    Lactate 3.4 (H) 0.4 - 2.0 mmol/L   C-reactive protein   Result Value Ref Range    C-Reactive Protein 8.74 (H) <1.00 mg/dL   Sedimentation rate, automated   Result Value Ref Range    Sedimentation Rate 18 0 - 30 mm/h   Lactate   Result Value Ref Range    Lactate 2.5 (H) 0.4 - 2.0 mmol/L   B-type natriuretic peptide   Result Value Ref Range    BNP 1,113 (H) 0 - 99 pg/mL       Assessment/Plan   Joann lora  89-year-old Mauritian speaking female patient presenting to emergency department with her family for evaluation of multiple foot wounds.  Consult placed to wound RN.  X-rays negative for osteomyelitis, elevated CRP, normal sed rate.  Patient started on IV antibiotics, medicated with Tylenol, IV fluids infusing.  Patient admitted for further medical management.    Right foot cellulitis  Admit to Dr. Morris  See imaging results above  Consult wound RN and appreciate input  Continue IV vancomycin/Zosyn  Tylenol as needed  No IV fluids due to elevated BNP  PT/OT  Repeat labs in a.m.    Elevated BNP  BNP 1163  Chest x-ray pending  No history of heart failure    Dementia/glaucoma/hypertension/osteoporosis  #Chronic conditions  Cardiac diet  Hypoglycemia protocol  Urinalysis pending    DVT Ppx  SCDs as tolerated  Activity with assistance  PT/OT    I spent 25 minutes in the professional and overall care of this patient.      Destini Davenport, APRN-CNP

## 2024-04-20 NOTE — PROGRESS NOTES
Joann Blank is a 89 y.o. female on day 0 of admission presenting with Cellulitis of foot.      Subjective   89 y.o. female presenting to emergency department for evaluation of a wound on the base of the fifth metatarsal.  The patient is Cook Islander speaking and presents with her son and granddaughter.  They state that they have been applying some topical gauze and ointment.  Patient also has a small lesion on her right heel and another lesion on her left foot near the first metatarsal.  Family became concerned so they brought her in for evaluation.  They state that the patient has seen her PCP approximately 1 month ago and did not have these issues.          Objective     Last Recorded Vitals  /81 (BP Location: Right arm, Patient Position: Lying)   Pulse 61   Temp 36.1 °C (97 °F) (Temporal)   Resp 16   Wt (!) 36.3 kg (80 lb)   SpO2 97%   Intake/Output last 3 Shifts:    Intake/Output Summary (Last 24 hours) at 4/20/2024 1215  Last data filed at 4/20/2024 0942  Gross per 24 hour   Intake 700 ml   Output --   Net 700 ml       Admission Weight  Weight: (!) 36.3 kg (80 lb) (04/19/24 1813)    Daily Weight  04/19/24 : (!) 36.3 kg (80 lb)    Image Results  XR chest 1 view  Narrative: Interpreted By:  Leonel Paiz,   STUDY:  XR CHEST 1 VIEW; 4/20/2024 6:57 am      INDICATION:  Signs/Symptoms:elevated bnp      COMPARISON:  July 2011      ACCESSION NUMBER(S):  [id number]      ORDERING CLINICIAN:  ANGELY SERNA      FINDINGS:  The study is limited due to rotation, respiratory motion and  underpenetration. The cardiac silhouette appears enlarged,  exaggerated by the technique. The aorta is ectatic and tortuous.  Bilateral infiltrates and pleural effusions are present, right more  than left. There is no pneumothorax.  Degenerative changes involve the spine and shoulders.      Impression: Limited exam. Bibasilar infiltrates and pleural effusions, right more  than left, possibly due to pulmonary edema or bilateral  pneumonia.  Correlate clinically and follow-up as needed.      Signed by: Leonel Paiz 4/20/2024 8:33 AM  Dictation workstation:   [id number]  In the review of systems is progressive infection no chest pain no shortness of breath she has renal insufficiency    Physical Exam  Awake and alert lungs are clear posteriorly heart has a regular rate and rhythm abdomen is soft is not distended or firm skin is warm and dry  Relevant Results               Assessment/Plan                  Principal Problem:    Cellulitis of foot    Right foot cellulitis podiatry is on consult abdomen reviewed the imaging studies I will continue with IV vancomycin and Zosyn watch for nephrotoxicity certainly specially in the wake of renal disease    She has chronic kidney disease and using vancomycin I will monitor with daily basic metabolic panel renal function monitor closely continue with DVT prophylaxis as well and I will review the chest x-ray repeat all the labs that they need I I am going to consult with nephrology as well and I will continue to treat as well as podiatry consult which is on board    I well review the white count monitor the renal function replace the electrolytes as needed monitor closely for IV antibiotic nephrotoxicity               Anshul Morris,

## 2024-04-20 NOTE — CONSULTS
Vancomycin Dosing by Pharmacy- FOLLOW UP    Joann Blank is a 89 y.o. year old female who Pharmacy has been consulted for vancomycin dosing for cellulitis, skin and soft tissue. Based on the patient's indication and renal status this patient is being dosed based on a goal trough/random level of 10-15.     Renal function is currently declining.  Current vancomycin dose: 500 mg given once on 4/19    Visit Vitals  /80 (BP Location: Right arm)   Pulse 69   Temp 35.8 °C (96.4 °F)   Resp 16        Lab Results   Component Value Date    CREATININE 1.86 (H) 04/19/2024    CREATININE 0.92 09/30/2021    CREATININE 0.75 02/06/2019        Patient weight is 36.3kg  Assessment/Plan    Will dose by levels due to renal function  The next level will be obtained on 4/20 at 2000. May be obtained sooner if clinically indicated.   Will continue to monitor renal function daily while on vancomycin and order serum creatinine at least every 48 hours if not already ordered.  Follow for continued vancomycin needs, clinical response, and signs/symptoms of toxicity.       Carmen Epperson, [facility]

## 2024-04-20 NOTE — PROGRESS NOTES
04/20/24 1050   Discharge Planning   Living Arrangements Children   Support Systems Children;Family members   Assistance Needed Patients son and daughter in law provide care for patient at home, has walker, wheelchair, bedside commode and shower chair   Type of Residence Private residence   Home or Post Acute Services Community services   Patient expects to be discharged to: Home with family     Met with patients granddaughter Star at bedside, introduced self and role on care transitions team. Admission assessment completed with patients granddaughter. Patient lives with her son and daughter in law. Address, insurance and contact information verified. PCP is Luis Carlos Hernandez, last visit was March 1st. Patients granddaughter provided her contact information [phone]. Patients granddaughter asking for information on palliative care, SW updated.

## 2024-04-20 NOTE — PROGRESS NOTES
Occupational Therapy    Occupational Therapy    Evaluation    Patient Name: Joann Blank  MRN: [id number]  Today's Date: 4/20/2024  Time Calculation  Start Time: 0944  Stop Time: 1013  Time Calculation (min): 29 min    Assessment  IP OT Assessment  OT Assessment: Joann Blank is a 89 yo F who participated in OT evaluation today. Pt is primarily limited by dec cognition and generalized weakness throughout BUEs and BLEs at baseline. Pt's main functional deficits include dec OOB activity in recent months, however, she is near functional baseline of last several months. Given pt's age, bed-dependence, and dementia dx, family's goal for pt is comfort care at this time. She is not appropriate to identify acute care OT goals at this time or OT at discharge, so will d/c pt. Recommend podiatry consult to address heel wounds and palliative care consult to address discharge plans. Recommend 24-hour supervision at discharge d/t impaired cognition. Recommend focus on family education and support.  End of Session Communication: Bedside nurse (All pt needs within reach and no complaints noted. Pt's granddaughter present.)  End of Session Patient Position: Bed, 2 rail up, Alarm on    Plan:  No Skilled OT: At baseline function  OT Frequency: OT eval only  OT Discharge Recommendations: 24 hr supervision due to cognition  OT Recommended Transfer Status: Dependent  OT - OK to Discharge: Yes    Subjective     Current Problem:  1. Cellulitis of foot  Vascular US ankle brachial index (YUE) without exercise    Vascular US ankle brachial index (YUE) without exercise      2. Non-pressure chronic ulcer of other part of left foot limited to breakdown of skin (Multi)  Vascular US ankle brachial index (YUE) without exercise    Vascular US ankle brachial index (YUE) without exercise        General:  General  Reason for Referral: OT Eval and Treat  Referred By: Destini Davenport, APRN-CNP  Past Medical History Relevant to Rehab: dementia,  HTN  Family/Caregiver Present: Yes  Caregiver Feedback: Granddaughter present and interpreting + providing occupational profile  Co-Treatment: PT  Co-Treatment Reason: To maximize pt safety and functional mobility  Prior to Session Communication: Bedside nurse  Patient Position Received: Bed, 2 rail up, Alarm on  General Comment: Pt presents from home for evaluation of bilateral heel wounds with new R foot darkened areas. Pt cleared for therapy by nursing. Pt asleep but easily awoken, and pleasant and agreeable to OT evaluation. Granddaughter provides pt's occupational profile and interprets throughout session.    Precautions:  Precautions Comment: Non-ambulatory (bed-dependent at baseline), non-English-speaking (speaks Ukranian), 2L O2 NC (not on O2 at baseline), hard of hearing    Pain:  Pain Assessment  Pain Assessment: 0-10  Pain Score: 0 - No pain    Objective     Cognition:  Overall Cognitive Status:  (Difficult to assess d/t language barrier, however pt has dementia at baseline (diagnosed a couple of years ago that has reportedly progressed per granddaughter). Pt follows simple commands with inc visual/tactile cues and granddaughter interpreting.)  Orientation Level:  (Pt alert, could not assess orientation)    Home Living:  Home Living Comments: Occupational profile obtained from pt's granddaughter who was present in pt's room. Pt lives in split-level home with her son, daughter-in-law, and grandson. Has tub shower and shower chair.     Prior Function:  Prior Function Comments: Pt's adult son who works full-time nights is her primary caretaker and performs all of pt's ADLs/IADLs. Other family members also provide care. Per granddaughter, pt had functional status change over last few months (throughout winter) as she has become more weak and less motivated to get out of bed/spends more time in bed. Inc incontinence. Pt sleeps in regular bed, wears diapers, and son transfers her to/from chair and BSC with max  A-dependent a few times/day. Per granddaughter, the family's goal for pt's care is comfort and not to walk again/increase independent function.    ADL:  ADL Comments: ANTICIPATE max A-dependent for all UB ADLs and dependent for all LB ADLs.    Activity Tolerance:  Endurance: Decreased tolerance for upright activites    Bed Mobility/Transfers:   Bed Mobility  Bed Mobility: Yes (repositioning toward [facility] for comfort, depedent x2)  Transfers  Transfer: No (bed dependent)    Ambulation/Gait Training:  Functional Mobility  Functional Mobility Performed: No (bed dependent)    Vision: Vision - Basic Assessment  Current Vision: No visual deficits    Sensation:  Light Touch: No apparent deficits    Extremities and Strength:  Strength Comments: Minimal AROM in BUEs, PROM both shoulders limited to 90 degrees, BUE PROM otherwise ~75%. Pt able to lightly squeeze therapist's fingers in both hands, 3/5. MMT DNT.    Hand Function:  Hand Function  Gross Grasp: Impaired  Coordination: Impaired    Outcome Measures: [facility] Daily Activity  Putting on and taking off regular lower body clothing: Total  Bathing (including washing, rinsing, drying): Total  Putting on and taking off regular upper body clothing: Total  Toileting, which includes using toilet, bedpan or urinal: Total  Taking care of personal grooming such as brushing teeth: Total  Eating Meals: Total  Daily Activity - Total Score: 6    EDUCATION:     Education Documentation  Body Mechanics, taught by Gail Stockton OT at 4/20/2024  2:52 PM.  Learner: Patient  Readiness: Acceptance  Method: Explanation  Response: Verbalizes Understanding, Demonstrated Understanding    Home Exercise Program, taught by Gail Stockton OT at 4/20/2024  2:52 PM.  Learner: Patient  Readiness: Acceptance  Method: Explanation  Response: Verbalizes Understanding, Demonstrated Understanding    ADL Training, taught by Gail Stockton OT at 4/20/2024  2:52 PM.  Learner: Patient  Readiness:  Acceptance  Method: Explanation  Response: Verbalizes Understanding, Demonstrated Understanding

## 2024-04-20 NOTE — CARE PLAN
The patient's goals for the shift include      The clinical goals for the shift include Pt will remain safe and comfortable throughout shift    Over the shift, the patient did not make progress toward the following goals. Barriers to progression include acute illness. Recommendations to address these barriers include communication.

## 2024-04-20 NOTE — SIGNIFICANT EVENT
Nursing called regarding a family request to change patient's diet to thickened liquids. Upon chart review, I noticed that patient had a CXR that showed bibasilar infiltrates and pleural effusions, right greater than left, possibly due to pulmonary edema or bilateral pneumonia. Also noted that patient's BNP was 1,113 on 4/19. Cardiology consulted and echo ordered.  Bedside swallow assessment was performed and patient passed. Speech evaluation then ordered.   Patient's current antibiotics adequately treat possible PNA. Legionella and S. Pneumo antigens ordered as well.

## 2024-04-21 ENCOUNTER — APPOINTMENT (OUTPATIENT)
Dept: CARDIOLOGY | Facility: HOSPITAL | Age: 89
DRG: 592 | End: 2024-04-21
Payer: COMMERCIAL

## 2024-04-21 LAB
ALBUMIN SERPL BCP-MCNC: 1.9 G/DL (ref 3.4–5)
ANION GAP SERPL CALC-SCNC: 11 MMOL/L (ref 10–20)
BUN SERPL-MCNC: 43 MG/DL (ref 6–23)
CALCIUM SERPL-MCNC: 7.7 MG/DL (ref 8.6–10.3)
CHLORIDE SERPL-SCNC: 110 MMOL/L (ref 98–107)
CO2 SERPL-SCNC: 23 MMOL/L (ref 21–32)
CREAT SERPL-MCNC: 1.68 MG/DL (ref 0.5–1.05)
EGFRCR SERPLBLD CKD-EPI 2021: 28 ML/MIN/1.73M*2
GLUCOSE SERPL-MCNC: 114 MG/DL (ref 74–99)
HOLD SPECIMEN: NORMAL
LEGIONELLA AG UR QL: NEGATIVE
PHOSPHATE SERPL-MCNC: 3.6 MG/DL (ref 2.5–4.9)
POTASSIUM SERPL-SCNC: 4.2 MMOL/L (ref 3.5–5.3)
PTH-INTACT SERPL-MCNC: 90.7 PG/ML (ref 18.5–88)
S PNEUM AG UR QL: NEGATIVE
SODIUM SERPL-SCNC: 140 MMOL/L (ref 136–145)
URATE SERPL-MCNC: 6.1 MG/DL (ref 2.3–6.7)
VANCOMYCIN TROUGH SERPL-MCNC: 9.2 UG/ML (ref 5–20)

## 2024-04-21 PROCEDURE — 96372 THER/PROPH/DIAG INJ SC/IM: CPT | Performed by: INTERNAL MEDICINE

## 2024-04-21 PROCEDURE — G0378 HOSPITAL OBSERVATION PER HR: HCPCS

## 2024-04-21 PROCEDURE — 2500000004 HC RX 250 GENERAL PHARMACY W/ HCPCS (ALT 636 FOR OP/ED)

## 2024-04-21 PROCEDURE — 93005 ELECTROCARDIOGRAM TRACING: CPT

## 2024-04-21 PROCEDURE — 36415 COLL VENOUS BLD VENIPUNCTURE: CPT | Performed by: INTERNAL MEDICINE

## 2024-04-21 PROCEDURE — 93010 ELECTROCARDIOGRAM REPORT: CPT | Performed by: STUDENT IN AN ORGANIZED HEALTH CARE EDUCATION/TRAINING PROGRAM

## 2024-04-21 PROCEDURE — 2500000001 HC RX 250 WO HCPCS SELF ADMINISTERED DRUGS (ALT 637 FOR MEDICARE OP): Performed by: NURSE PRACTITIONER

## 2024-04-21 PROCEDURE — 2500000004 HC RX 250 GENERAL PHARMACY W/ HCPCS (ALT 636 FOR OP/ED): Performed by: NURSE PRACTITIONER

## 2024-04-21 PROCEDURE — 84550 ASSAY OF BLOOD/URIC ACID: CPT | Performed by: INTERNAL MEDICINE

## 2024-04-21 PROCEDURE — 2500000004 HC RX 250 GENERAL PHARMACY W/ HCPCS (ALT 636 FOR OP/ED): Performed by: INTERNAL MEDICINE

## 2024-04-21 PROCEDURE — 99222 1ST HOSP IP/OBS MODERATE 55: CPT | Performed by: INTERNAL MEDICINE

## 2024-04-21 PROCEDURE — 80202 ASSAY OF VANCOMYCIN: CPT | Performed by: NURSE PRACTITIONER

## 2024-04-21 PROCEDURE — 80069 RENAL FUNCTION PANEL: CPT | Performed by: INTERNAL MEDICINE

## 2024-04-21 RX ORDER — DONEPEZIL HYDROCHLORIDE 5 MG/1
5 TABLET, FILM COATED ORAL DAILY
Status: DISCONTINUED | OUTPATIENT
Start: 2024-04-21 | End: 2024-04-24 | Stop reason: HOSPADM

## 2024-04-21 RX ORDER — POTASSIUM &MAGNESIUM ASPARTATE 250-250 MG
500 CAPSULE ORAL DAILY
Status: DISCONTINUED | OUTPATIENT
Start: 2024-04-21 | End: 2024-04-24 | Stop reason: HOSPADM

## 2024-04-21 RX ORDER — IPRATROPIUM BROMIDE AND ALBUTEROL SULFATE 2.5; .5 MG/3ML; MG/3ML
3 SOLUTION RESPIRATORY (INHALATION) EVERY 2 HOUR PRN
Status: DISCONTINUED | OUTPATIENT
Start: 2024-04-21 | End: 2024-04-24 | Stop reason: HOSPADM

## 2024-04-21 RX ORDER — VANCOMYCIN HYDROCHLORIDE 500 MG/100ML
500 INJECTION, SOLUTION INTRAVENOUS ONCE
Status: COMPLETED | OUTPATIENT
Start: 2024-04-21 | End: 2024-04-21

## 2024-04-21 RX ORDER — DONEPEZIL HYDROCHLORIDE 5 MG/1
5 TABLET, FILM COATED ORAL ONCE
Status: COMPLETED | OUTPATIENT
Start: 2024-04-21 | End: 2024-04-21

## 2024-04-21 RX ORDER — POTASSIUM &MAGNESIUM ASPARTATE 250-250 MG
500 CAPSULE ORAL DAILY
COMMUNITY

## 2024-04-21 RX ORDER — HEPARIN SODIUM 5000 [USP'U]/ML
5000 INJECTION, SOLUTION INTRAVENOUS; SUBCUTANEOUS EVERY 8 HOURS SCHEDULED
Status: DISCONTINUED | OUTPATIENT
Start: 2024-04-21 | End: 2024-04-24 | Stop reason: HOSPADM

## 2024-04-21 RX ORDER — TRAZODONE HYDROCHLORIDE 50 MG/1
50 TABLET ORAL NIGHTLY
Status: DISCONTINUED | OUTPATIENT
Start: 2024-04-21 | End: 2024-04-24 | Stop reason: HOSPADM

## 2024-04-21 RX ORDER — DONEPEZIL HYDROCHLORIDE 5 MG/1
5 TABLET, FILM COATED ORAL DAILY
COMMUNITY

## 2024-04-21 RX ORDER — DEXTROSE, SODIUM CHLORIDE, SODIUM LACTATE, POTASSIUM CHLORIDE, AND CALCIUM CHLORIDE 5; .6; .31; .03; .02 G/100ML; G/100ML; G/100ML; G/100ML; G/100ML
60 INJECTION, SOLUTION INTRAVENOUS CONTINUOUS
Status: DISCONTINUED | OUTPATIENT
Start: 2024-04-21 | End: 2024-04-21

## 2024-04-21 RX ORDER — IPRATROPIUM BROMIDE AND ALBUTEROL SULFATE 2.5; .5 MG/3ML; MG/3ML
3 SOLUTION RESPIRATORY (INHALATION) 3 TIMES DAILY
Status: DISCONTINUED | OUTPATIENT
Start: 2024-04-21 | End: 2024-04-21

## 2024-04-21 RX ORDER — IPRATROPIUM BROMIDE AND ALBUTEROL SULFATE 2.5; .5 MG/3ML; MG/3ML
SOLUTION RESPIRATORY (INHALATION)
Status: DISCONTINUED
Start: 2024-04-21 | End: 2024-04-21 | Stop reason: WASHOUT

## 2024-04-21 RX ORDER — HYDRALAZINE HYDROCHLORIDE 20 MG/ML
5 INJECTION INTRAMUSCULAR; INTRAVENOUS EVERY 6 HOURS PRN
Status: DISCONTINUED | OUTPATIENT
Start: 2024-04-21 | End: 2024-04-24 | Stop reason: HOSPADM

## 2024-04-21 RX ORDER — LOSARTAN POTASSIUM 50 MG/1
50 TABLET ORAL DAILY
Status: DISCONTINUED | OUTPATIENT
Start: 2024-04-21 | End: 2024-04-24 | Stop reason: HOSPADM

## 2024-04-21 RX ORDER — TRAZODONE HYDROCHLORIDE 50 MG/1
50 TABLET ORAL NIGHTLY
COMMUNITY

## 2024-04-21 RX ORDER — DEXTROSE, SODIUM CHLORIDE, SODIUM LACTATE, POTASSIUM CHLORIDE, AND CALCIUM CHLORIDE 5; .6; .31; .03; .02 G/100ML; G/100ML; G/100ML; G/100ML; G/100ML
60 INJECTION, SOLUTION INTRAVENOUS ONCE
Status: DISCONTINUED | OUTPATIENT
Start: 2024-04-21 | End: 2024-04-21

## 2024-04-21 RX ORDER — DEXTROSE, SODIUM CHLORIDE, SODIUM LACTATE, POTASSIUM CHLORIDE, AND CALCIUM CHLORIDE 5; .6; .31; .03; .02 G/100ML; G/100ML; G/100ML; G/100ML; G/100ML
60 INJECTION, SOLUTION INTRAVENOUS ONCE
Status: COMPLETED | OUTPATIENT
Start: 2024-04-21 | End: 2024-04-21

## 2024-04-21 RX ORDER — LOSARTAN POTASSIUM 50 MG/1
50 TABLET ORAL DAILY
COMMUNITY

## 2024-04-21 RX ADMIN — ACETAMINOPHEN 650 MG: 325 TABLET ORAL at 23:22

## 2024-04-21 RX ADMIN — ACETAMINOPHEN 650 MG: 325 TABLET ORAL at 12:58

## 2024-04-21 RX ADMIN — ACETAMINOPHEN 650 MG: 325 TABLET ORAL at 02:18

## 2024-04-21 RX ADMIN — VANCOMYCIN HYDROCHLORIDE 500 MG: 500 INJECTION, SOLUTION INTRAVENOUS at 21:43

## 2024-04-21 RX ADMIN — HYDRALAZINE HYDROCHLORIDE 5 MG: 20 INJECTION INTRAMUSCULAR; INTRAVENOUS at 23:05

## 2024-04-21 RX ADMIN — PIPERACILLIN SODIUM AND TAZOBACTAM SODIUM 2.25 G: 2; .25 INJECTION, SOLUTION INTRAVENOUS at 09:49

## 2024-04-21 RX ADMIN — PIPERACILLIN SODIUM AND TAZOBACTAM SODIUM 2.25 G: 2; .25 INJECTION, SOLUTION INTRAVENOUS at 04:16

## 2024-04-21 RX ADMIN — HEPARIN SODIUM 5000 UNITS: 5000 INJECTION INTRAVENOUS; SUBCUTANEOUS at 21:11

## 2024-04-21 RX ADMIN — POLYETHYLENE GLYCOL 3350 17 G: 17 POWDER, FOR SOLUTION ORAL at 12:44

## 2024-04-21 RX ADMIN — PIPERACILLIN SODIUM AND TAZOBACTAM SODIUM 2.25 G: 2; .25 INJECTION, SOLUTION INTRAVENOUS at 20:12

## 2024-04-21 RX ADMIN — DONEPEZIL HYDROCHLORIDE 5 MG: 5 TABLET, FILM COATED ORAL at 13:23

## 2024-04-21 RX ADMIN — PIPERACILLIN SODIUM AND TAZOBACTAM SODIUM 2.25 G: 2; .25 INJECTION, SOLUTION INTRAVENOUS at 15:01

## 2024-04-21 RX ADMIN — HYDRALAZINE HYDROCHLORIDE 5 MG: 20 INJECTION INTRAMUSCULAR; INTRAVENOUS at 12:47

## 2024-04-21 RX ADMIN — Medication 500 MG: at 12:48

## 2024-04-21 RX ADMIN — SODIUM CHLORIDE, SODIUM LACTATE, POTASSIUM CHLORIDE, CALCIUM CHLORIDE AND DEXTROSE MONOHYDRATE 60 ML/HR: 5; 600; 310; 30; 20 INJECTION, SOLUTION INTRAVENOUS at 17:56

## 2024-04-21 RX ADMIN — TRAZODONE HYDROCHLORIDE 50 MG: 50 TABLET ORAL at 20:12

## 2024-04-21 RX ADMIN — VANCOMYCIN HYDROCHLORIDE 500 MG: 500 INJECTION, SOLUTION INTRAVENOUS at 00:25

## 2024-04-21 ASSESSMENT — COGNITIVE AND FUNCTIONAL STATUS - GENERAL
MOVING TO AND FROM BED TO CHAIR: TOTAL
EATING MEALS: TOTAL
HELP NEEDED FOR BATHING: TOTAL
TOILETING: TOTAL
STANDING UP FROM CHAIR USING ARMS: TOTAL
DAILY ACTIVITIY SCORE: 6
HELP NEEDED FOR BATHING: TOTAL
TOILETING: TOTAL
DRESSING REGULAR UPPER BODY CLOTHING: TOTAL
DRESSING REGULAR UPPER BODY CLOTHING: TOTAL
DAILY ACTIVITIY SCORE: 6
CLIMB 3 TO 5 STEPS WITH RAILING: TOTAL
MOVING FROM LYING ON BACK TO SITTING ON SIDE OF FLAT BED WITH BEDRAILS: TOTAL
DRESSING REGULAR LOWER BODY CLOTHING: TOTAL
PERSONAL GROOMING: TOTAL
WALKING IN HOSPITAL ROOM: TOTAL
PERSONAL GROOMING: TOTAL
EATING MEALS: TOTAL
TURNING FROM BACK TO SIDE WHILE IN FLAT BAD: TOTAL
MOVING FROM LYING ON BACK TO SITTING ON SIDE OF FLAT BED WITH BEDRAILS: TOTAL
DRESSING REGULAR LOWER BODY CLOTHING: TOTAL
STANDING UP FROM CHAIR USING ARMS: TOTAL
CLIMB 3 TO 5 STEPS WITH RAILING: TOTAL
TURNING FROM BACK TO SIDE WHILE IN FLAT BAD: TOTAL
MOBILITY SCORE: 6
MOBILITY SCORE: 6
MOVING TO AND FROM BED TO CHAIR: TOTAL
WALKING IN HOSPITAL ROOM: TOTAL

## 2024-04-21 ASSESSMENT — PAIN DESCRIPTION - ORIENTATION: ORIENTATION: RIGHT

## 2024-04-21 ASSESSMENT — PAIN SCALES - GENERAL
PAINLEVEL_OUTOF10: 0 - NO PAIN
PAINLEVEL_OUTOF10: 3
PAINLEVEL_OUTOF10: 3

## 2024-04-21 ASSESSMENT — PAIN - FUNCTIONAL ASSESSMENT
PAIN_FUNCTIONAL_ASSESSMENT: 0-10
PAIN_FUNCTIONAL_ASSESSMENT: 0-10

## 2024-04-21 ASSESSMENT — PAIN DESCRIPTION - LOCATION: LOCATION: FOOT

## 2024-04-21 NOTE — CONSULTS
Reason For Consult  Elevated serum creatinine and oliguria    History Of Present Illness  Joann Blank is a 89 y.o. female presenting with foot wound mild dysuria and radiographic evidence of possible aspiration syndrome.  You have elevation of the serum creatinine to 1.86 with a BUN of 39 on admission.  And has been having oliguria in the past 24 hours today BUN is 43 creatinine 1.68 she hypochloremia with chloride of 110 she had a urinalysis which show pyuria with more than 50 WBCs per high-power field and 2+ bacteria in the urinary sediment  Patient fractional excretion of sodium was 0.86 6%  Patient is currently being treated with IV antibiotics  And liver functions are mildly elevated with a AST of 40 and a bilirubin of 1.1  Patient PTH is elevated 90.7  Patient C-reactive protein is elevated  Past Medical History  Acute kidney injury  Dementia  Hypertension   glaucoma   osteoporosis   Foot infection   pneumonia   urinary tract infection  Transaminitis            Surgical History  She has no past surgical history on file.     Social History  She reports that she has never smoked. She has never been exposed to tobacco smoke. She has never used smokeless tobacco. No history on file for alcohol use and drug use.    Family History  No family history on file.     Allergies  Patient has no known allergies.    Review of Systems  10 point reviews are negative for exception of the present illness     Physical Exam  General appearance; asthenic look  Enophthalmos  Neck; no JVD or bruit  Lungs; crackles on inspiration at the bases  Heart; regular rate no gallops or rubs  Abdomen; active peristalsis no rebound guarding organomegaly or shifting dullness  Extremities; decreased muscle tone  Neurological; no tremors no asterixis       I&O 24HR    Intake/Output Summary (Last 24 hours) at 4/21/2024 1735  Last data filed at 4/21/2024 1633  Gross per 24 hour   Intake 630 ml   Output 250 ml   Net 380 ml       Vitals 24HR  Heart  Rate:  [62-79]   Temp:  [35.7 °C (96.3 °F)-36.5 °C (97.7 °F)]   Resp:  [16-18]   BP: (136-199)/(62-91)   SpO2:  [92 %-99 %]         Relevant Results  Results for orders placed or performed during the hospital encounter of 04/19/24 (from the past 24 hour(s))   Vancomycin, Trough   Result Value Ref Range    Vancomycin, Trough 5.9 5.0 - 20.0 ug/mL   Uric Acid   Result Value Ref Range    Uric Acid 6.1 2.3 - 6.7 mg/dL   Renal Function Panel   Result Value Ref Range    Glucose 114 (H) 74 - 99 mg/dL    Sodium 140 136 - 145 mmol/L    Potassium 4.2 3.5 - 5.3 mmol/L    Chloride 110 (H) 98 - 107 mmol/L    Bicarbonate 23 21 - 32 mmol/L    Anion Gap 11 10 - 20 mmol/L    Urea Nitrogen 43 (H) 6 - 23 mg/dL    Creatinine 1.68 (H) 0.50 - 1.05 mg/dL    eGFR 28 (L) >60 mL/min/1.73m*2    Calcium 7.7 (L) 8.6 - 10.3 mg/dL    Phosphorus 3.6 2.5 - 4.9 mg/dL    Albumin 1.9 (L) 3.4 - 5.0 g/dL   Lavender Top   Result Value Ref Range    Extra Tube Hold for add-ons.        MR foot right wo IV contrast    Result Date: 4/21/2024  Interpreted By:  Brent Giralod, STUDY: MRI of the  right foot without IV contrast;  4/20/2024 2:58 pm   INDICATION: Signs/Symptoms:pressure ulcerations right foot. Xr showed focal gas.   COMPARISON: None.   ACCESSION NUMBER(S): [id number]   ORDERING CLINICIAN: EDILSON ALEGRE   TECHNIQUE: MR imaging of the  right foot was obtained  without administration of intravenous contrast medium. The patient is limited by motion. Effusion consist were not able to be completed as the patient was unable to tolerate the exam.   FINDINGS: Skin and soft tissue defect present in the posterior aspect of the calcaneus. There is associated cellulitis. No definite marrow edema or loss of the T1 signal seen on these views.   Markedly limited evaluation of the forefoot due to motion. There is diffuse soft tissue edema in the forefoot especially dorsally. No discrete soft tissue collection seen.   No evidence of abnormal marrow edema or  osteomyelitis by MRI in the 4th toe.   Moderate muscle in the plantar musculature suggestive of ischemic myopathy.   Evaluation of tendons or ligaments is markedly limited due to the limitations of the study. No severe tenosynovitis seen. There is no fracture in the visualized osseous structures       Study is limited and incomplete as the patient was unable to tolerate the exam.   1. Within this limitation no evidence of osteomyelitis in the posterior calcaneus or the 4th digit. Ulcers are present at these 2 locations with associated cellulitis and phlegmonous changes. If there is persistent clinical concern however repeat MR imaging with sedation can be performed 2. 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 This study was interpreted at [place], [place].   MACRO: None   Signed by: Brent Giraldo 4/21/2024 9:49 AM Dictation workstation:   [id number]    XR chest 1 view    Result Date: 4/20/2024  Interpreted By:  Leonel Paiz, STUDY: XR CHEST 1 VIEW; 4/20/2024 6:57 am   INDICATION: Signs/Symptoms:elevated bnp   COMPARISON: July 2011   ACCESSION NUMBER(S): [id number]   ORDERING CLINICIAN: ANGELY SERNA   FINDINGS: The study is limited due to rotation, respiratory motion and underpenetration. The cardiac silhouette appears enlarged, exaggerated by the technique. The aorta is ectatic and tortuous. Bilateral infiltrates and pleural effusions are present, right more than left. There is no pneumothorax. Degenerative changes involve the spine and shoulders.       Limited exam. Bibasilar infiltrates and pleural effusions, right more than left, possibly due to pulmonary edema or bilateral pneumonia. Correlate clinically and follow-up as needed.   Signed by: Leonel Paiz 4/20/2024 8:33 AM Dictation workstation:   [id number]    XR foot 3+ views bilateral    Result Date:  4/19/2024  Interpreted By:  Carol Briones, STUDY: XR FOOT 3+ VIEWS BILATERAL; ;  4/19/2024 7:13 pm   INDICATION: Signs/Symptoms:foot pain and blackened 5th toe area.   COMPARISON: None.   ACCESSION NUMBER(S): [id number]   ORDERING CLINICIAN: ROOSEVELT BEDOLLA   FINDINGS: Three views left foot: There is osteopenia. No acute fracture or malalignment. Mild midfoot degenerative changes. Mild diffuse soft tissue edema. No soft tissue gas or radiopaque foreign body.   Three views right foot: There is osteopenia. No acute fracture or malalignment. No osseous destruction or abnormal periosteal bone formation. Hallux valgus deformity and mild 1st metatarsophalangeal osteoarthrosis are noted. There is focal soft tissue gas involving the lateral aspect of the base of the 5th toe, suggesting ulceration. No radiopaque foreign body. Diffuse soft tissue edema noted.       No acute osseous abnormality.   Focal soft tissue gas involving the lateral base of the 5th toe suggesting ulceration. No radiographic evidence of osteomyelitis.   Additional chronic findings as above.   MACRO: None   Signed by: Carol Briones 4/19/2024 7:34 PM Dictation workstation:   [id number]       Assessment/Plan   Acute kidney injury  Blood chloremia  Transaminitis  Urinary tract infection  Pneumonia  Secondary hyperparathyroidism  Oliguria    Plan  Will give patient 1 bag of D5LR  DuoNeb aerosols   continue current antibiotics  Thank you for the consult        Ciro Chávez MD

## 2024-04-21 NOTE — PROGRESS NOTES
PHARMACY NOTE:   Verification of Patient's Own Meds  Does the patient name on the medication container/label match the inpatient patient name? N/A  Is the medication in date (1 year from dispensing date)? N/A  Is the medication in the original dispensing container? YES    Identification of Medication  Was the medication successfully identified? Yes  Was the Authorization for Treatment with Patient’s Own Medication” form (Attachment A in MM-16) completed? N/A    India Johnson PharmD, BCPS   4/21/2024 11:13 AM

## 2024-04-21 NOTE — PROGRESS NOTES
Joann Blank is a 89 y.o. female on day 0 of admission presenting with Cellulitis of foot.    Subjective   Pt asleep for entire encounter, accompanied by family. Family questions were answered       Physical Exam    Objective     REVIEW OF SYSTEMS  GENERAL:  Negative for malaise, significant weight loss, fever  HEENT:  No changes in hearing or vision, no nose bleeds or other nasal problems and Negative for frequent or significant headaches  NECK:  Negative for lumps, goiter, pain and significant neck swelling  RESPIRATORY:  Negative for cough, wheezing and shortness of breath  CARDIOVASCULAR:  Negative for chest pain, leg swelling and palpitations  GI:  Negative for abdominal discomfort, blood in stools or black stools and change in bowel habits  :  Negative for dysuria, frequency and incontinence  MUSCULOSKELETAL:  Pain in right foot   SKIN:  Pressure wound right foot   PSYCH:  Negative for sleep disturbance, mood disorder and recent psychosocial stressors  HEMATOLOGY/LYMPHOLOGY:  Negative for prolonged bleeding, bruising easily, and swollen nodes.  ENDOCRINE:  Negative for cold or heat intolerance, polyuria, polydipsia and goiter  NEURO: Negative, denies any burning, tingling or numbness      Objective:   Vasc: DP and PT pulses are faintly bilateral.  CFT is less than 3 seconds bilateral.  Skin temperature is warm to cool proximal to distal bilateral.  erythema to the dorsum of the foot      Neuro:  Light touch is intact to the foot bilateral.  Protective sensation is intact to the foot when tested with the 5.07 SWM bilateral.  There is no clonus noted.  The hallux is downgoing bilateral.       Derm: Skin is supple with normal texture and turgor noted.  Webspaces are clean, dry and intact bilateral.  There are no hyperkeratoses, ulcerations, verruca or other lesions noted.  Pressure ulceration of right heel with necrotic cap that is well adhered, no fluctuance underneath. Periwound erythema. Pressure ulceration  "of right lateral 5th toe and metatarsal area with necrotic cap that is well adhered with no fluctuance. Periwound erythema noted. Left calf has start of eccymosis.      Ortho: Muscle strength is 5/5 for all pedal groups tested.  Ankle joint, subtalar joint, 1st MPJ and lesser MPJ ROM is full and without pain or crepitus.  The foot type is rectus bilateral off weight bearing.  There are no structural deformities noted.         Last Recorded Vitals  Blood pressure 137/82, pulse 67, temperature 36.5 °C (97.7 °F), temperature source Temporal, resp. rate 16, height 1.575 m (5' 2\"), weight (!) 36.3 kg (80 lb), SpO2 99%.    Intake/Output last 3 Shifts:  I/O last 3 completed shifts:  In: 750 (20.7 mL/kg) [IV Piggyback:750]  Out: - (0 mL/kg)   Dosing Weight: 36.3 kg     WBC downtrending, 9.8  Lactate down trending 2.5  Elevated CRP, normal ESR  XR showed no osseous involvement, though soft tissue focal gas on lateral right pressure area consistent with ulcer  MRI not tolerated, but no OM on images seen.    Assessment/Plan   Principal Problem:    Cellulitis of foot  # Unstageable pressure ulceration, right heel  # Unstageable pressure ulceration, right lateral foot  # unstageable pressuer ulceration, left heel   # DTI left calf  # Cellulitis, right foot  # PAD     -Patient was seen and evaluated; all findings were discussed and all questions were answered to patient's satisfaction.  - Charts, labs, vitals and imaging all reviewed.   - Imaging: reviewed   - Labs: reviewed  - PVRs: pending   - Abx:  vanc/zosyn     RECOMMENDATIONS:   - Continue with IV abx per primary, responding well  - Prevlon off loading boots ordered, need to offload legs and pressured areas  - PVRs ordered to assess possible underlying vascular disease  - MRI right foot reviewed, no OM seen on images captured. MRI not tolerated  - Currently no surgical intervention, due to family expectations and age, most likely will not elect surgery.   - Planned " conservative treatment with daily dressing changes of betadine to keep wound dry and stable  - Dressings: betadine wtd  - Nursing staff is able to change/reinforce dressing if & as necessary until next day’s dressing change. Thank you.  - will continue following while in house      Case to be discussed with attending, A&P above reflect tentative plan. Please await for final signature from attending physician on service.     Sonu Kline DPM PGY-1  Podiatric Medicine & Surgery  [place]

## 2024-04-21 NOTE — PROGRESS NOTES
Joann Blank is a 89 y.o. female on day 0 of admission presenting with Cellulitis of foot.      Subjective   Seen today resolving acute kidney injury has a urinary tract infection urine culture has not grown out anything at this point.  Blood cultures are no growth for 1 day and continuing treat her condition IV fluids along with broad-spectrum antibiotic coverage, chest x-ray also shows bilateral infiltrate and pleural effusions present right greater than left, also I am going to review the MRI there is some focal soft tissue gas involving the lateral base of the fifth toe suggesting an ulceration no radiographic evidence of osteomyelitis       Objective     Last Recorded Vitals  /74 (BP Location: Left arm, Patient Position: Lying)   Pulse 73   Temp 36.4 °C (97.5 °F) (Temporal)   Resp 18   Wt (!) 36.3 kg (80 lb)   SpO2 97%   Intake/Output last 3 Shifts:    Intake/Output Summary (Last 24 hours) at 4/21/2024 1824  Last data filed at 4/21/2024 1633  Gross per 24 hour   Intake 630 ml   Output 250 ml   Net 380 ml       Admission Weight  Weight: (!) 36.3 kg (80 lb) (04/19/24 1813)    Daily Weight  04/19/24 : (!) 36.3 kg (80 lb)    Image Results  MR foot right wo IV contrast  Narrative: Interpreted By:  Brent Giraldo,   STUDY:  MRI of the  right foot without IV contrast;  4/20/2024 2:58 pm      INDICATION:  Signs/Symptoms:pressure ulcerations right foot. Xr showed focal gas.      COMPARISON:  None.      ACCESSION NUMBER(S):  [id number]      ORDERING CLINICIAN:  EDILSON ALEGRE      TECHNIQUE:  MR imaging of the  right foot was obtained  without administration of  intravenous contrast medium. The patient is limited by motion.  Effusion consist were not able to be completed as the patient was  unable to tolerate the exam.      FINDINGS:  Skin and soft tissue defect present in the posterior aspect of the  calcaneus. There is associated cellulitis. No definite marrow edema  or loss of the T1 signal seen on  these views.      Markedly limited evaluation of the forefoot due to motion. There is  diffuse soft tissue edema in the forefoot especially dorsally. No  discrete soft tissue collection seen.      No evidence of abnormal marrow edema or osteomyelitis by MRI in the  4th toe.      Moderate muscle in the plantar musculature suggestive of ischemic  myopathy.      Evaluation of tendons or ligaments is markedly limited due to the  limitations of the study. No severe tenosynovitis seen. There is no  fracture in the visualized osseous structures      Impression: Study is limited and incomplete as the patient was unable to tolerate  the exam.      1. Within this limitation no evidence of osteomyelitis in the  posterior calcaneus or the 4th digit. Ulcers are present at these 2  locations with associated cellulitis and phlegmonous changes. If  there is persistent clinical concern however repeat MR imaging with  sedation can be performed  2. 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 This study was interpreted at [place], [place].      MACRO:  None      Signed by: Brent Giraldo 4/21/2024 9:49 AM  Dictation workstation:   [id number]    Results from last 7 days   Lab Units 04/20/24  0640   WBC AUTO x10*3/uL 9.8   HEMOGLOBIN g/dL 12.2   HEMATOCRIT % 39.3   PLATELETS AUTO x10*3/uL 127*      Results from last 7 days   Lab Units 04/21/24  0623   SODIUM mmol/L 140   POTASSIUM mmol/L 4.2   CHLORIDE mmol/L 110*   CO2 mmol/L 23   BUN mg/dL 43*   CREATININE mg/dL 1.68*   GLUCOSE mg/dL 114*   CALCIUM mg/dL 7.7*      In the review of systems is weakness lethargy no fever no vomiting or diarrhea    Physical Exam  So far awake and alert lungs are diminished but clear heart has regular rate and rhythm rhythm abdomen is soft is not distended or firm there is no clubbing or peripheral cyanosis nailbeds  are pink no palmar edema  Relevant Results               Assessment/Plan                  Principal Problem:    Cellulitis of foot    Urinary tract infection    For now continue with IV Zosyn and continue broad coverage IV fluids urine culture so far has not grown out any anything in the blood cultures are negative for 1 day    Bibasilar infiltrates on chest x-ray with a bilateral pleural effusions right greater than left.  Going to go ahead and consult with pulmonary regarding these infiltrates and for the pleural effusions    With the x-ray of the foot with 3 views shows a focal soft tissue gas involving the lateral base of the fifth toe suggesting an ulceration no radiographic evidence of osteomyelitis.  Would like to CV MRI of the foot and also I think believe she is going to have PVRs done as well she got multiple multiple different issues involved right now from this pneumonia to the cellulitis of the foot with the eschar and urinary tract infection for now cultures are negative so far continue and follow her course repeat the labs for the morning thank you              Anshul Morris, DO

## 2024-04-21 NOTE — CONSULTS
Cardiology Consult Note    Inpatient consult to Cardiology  Consult performed by: Viridiana Burgos MD  Consult ordered by: Annalise Mosley PA-C         Joann Blank is a 89 y.o. female on day 0 of admission presenting with Cellulitis of foot.      Subjective   89-year-old female who does not speak English.  Admitted with wound fifth metatarsal.  History obtained from her son.  Very strong typically.  Only in the hospital once in the last 5 years.  No chest pain or pressure no shortness of breath no palpitations.  Chest x-ray bilateral pleural effusions.  BNP markedly elevated.  Creatinine 1.86.  Again the patient denies any symptoms of chest pain or pressure shortness of breath palpitations orthopnea or lower extremity edema.       ROS:  10 systems reviewed other than what is mentioned above.     PMH  1.  Hypertension  2.  Dementia  3.  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    Medications:  Prior to Admission medications    Medication Sig Start Date End Date Taking? Authorizing Provider   cranberry 500 mg capsule Take 500 mg by mouth once daily. Patient has home med    Historical Provider, MD   donepezil  (Aricept) 5 mg tablet Take 1 tablet (5 mg) by mouth once daily.    Historical Provider, MD   losartan (Cozaar) 50 mg tablet Take 1 tablet (50 mg) by mouth once daily.    Historical Provider, MD   traZODone (Desyrel) 50 mg tablet Take 1 tablet (50 mg) by mouth once daily at bedtime. Patient takes 1-2 tablets by mouth at bedtime    Historical Provider, MD     Current Facility-Administered Medications   Medication Dose Route Frequency Provider Last Rate Last Admin    acetaminophen (Tylenol) tablet 650 mg  650 mg oral q4h PRN Destini Davenport, APRN-CNP   650 mg at 04/21/24 0218    cranberry capsule 500 mg  500 mg oral Daily Sofy Johnson APRN-CNP        dextrose 50 % injection 12.5 g  12.5 g intravenous q15 min PRN Destini Davenport, APRN-CNP        dextrose 50 % injection 25 g  25 g intravenous q15 min PRN Destini Davenport, APRN-CNP        donepezil (Aricept) tablet 5 mg  5 mg oral Daily Sofy Johnson APRN-CNP        glucagon (Glucagen) injection 1 mg  1 mg intramuscular q15 min PRN Destini Davenport APRN-CNP        glucagon (Glucagen) injection 1 mg  1 mg intramuscular q15 min PRN Destini Davenport, APRN-ARIELLE        hydrALAZINE (Apresoline) injection 5 mg  5 mg intravenous q6h PRN ALBIN Naik-CNP        [Held by provider] losartan (Cozaar) tablet 50 mg  50 mg oral Daily Sofy Johnson APRN-CNP        piperacillin-tazobactam-dextrose (Zosyn) IV 2.25 g  2.25 g intravenous q6h Destini Davenport APRN-CNP   Stopped at 04/21/24 1019    polyethylene glycol (Glycolax, Miralax) packet 17 g  17 g oral Daily PRN Annalise Mosley PA-C        traZODone (Desyrel) tablet 50 mg  50 mg oral Nightly Sofy Johnson APRN-CNP        vancomycin (Vancocin) pharmacy to dose - pharmacy monitoring   miscellaneous Daily PRN Destini Davenport APRN-CNP           Allergies:  No Known Allergies    Social History:  Social History     Tobacco Use    Smoking status: Never     Passive exposure: Never    Smokeless  tobacco: Never   Substance Use Topics    Alcohol use: Not on file       Physical Exam:  Last Recorded Vitals  /82 (BP Location: Left arm, Patient Position: Sitting)   Pulse 67   Temp 36.5 °C (97.7 °F) (Temporal)   Resp 16   Wt (!) 36.3 kg (80 lb)   SpO2 99%   Intake/Output last 3 Shifts:    Intake/Output Summary (Last 24 hours) at 4/21/2024 1110  Last data filed at 4/21/2024 1019  Gross per 24 hour   Intake 50 ml   Output --   Net 50 ml       Admission Weight  Weight: (!) 36.3 kg (80 lb) (04/19/24 1813)    Daily Weight  04/19/24 : (!) 36.3 kg (80 lb)      Patient is alert and oriented x3.  HEENT is unremarkable mucous members are moist  Neck no JVP no bruits upstrokes are full no thyromegaly  Lungs are clear bilaterally.  No wheezing crackles or rales  Heart regular rhythm normal S1-S2 there is no S3 no murmurs are heard.  Abdomen is soft vessels are positive nontender nondistended no organomegaly no pulsatile masses  Extremities have no edema.  Distal pulses present palpable.  Neuro is grossly nonfocal  Skin has no rashes     Image Results  MR foot right wo IV contrast  Narrative: Interpreted By:  Brent Giraldo,   STUDY:  MRI of the  right foot without IV contrast;  4/20/2024 2:58 pm      INDICATION:  Signs/Symptoms:pressure ulcerations right foot. Xr showed focal gas.      COMPARISON:  None.      ACCESSION NUMBER(S):  [id number]      ORDERING CLINICIAN:  EDILSON ALEGRE      TECHNIQUE:  MR imaging of the  right foot was obtained  without administration of  intravenous contrast medium. The patient is limited by motion.  Effusion consist were not able to be completed as the patient was  unable to tolerate the exam.      FINDINGS:  Skin and soft tissue defect present in the posterior aspect of the  calcaneus. There is associated cellulitis. No definite marrow edema  or loss of the T1 signal seen on these views.      Markedly limited evaluation of the forefoot due to motion. There is  diffuse soft  tissue edema in the forefoot especially dorsally. No  discrete soft tissue collection seen.      No evidence of abnormal marrow edema or osteomyelitis by MRI in the  4th toe.      Moderate muscle in the plantar musculature suggestive of ischemic  myopathy.      Evaluation of tendons or ligaments is markedly limited due to the  limitations of the study. No severe tenosynovitis seen. There is no  fracture in the visualized osseous structures      Impression: Study is limited and incomplete as the patient was unable to tolerate  the exam.      1. Within this limitation no evidence of osteomyelitis in the  posterior calcaneus or the 4th digit. Ulcers are present at these 2  locations with associated cellulitis and phlegmonous changes. If  there is persistent clinical concern however repeat MR imaging with  sedation can be performed  2. 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 This study was interpreted at [place], [place].      MACRO:  None      Signed by: Brent Giraldo 4/21/2024 9:49 AM  Dictation workstation:   [id number]        Relevant Results:  Results for orders placed or performed during the hospital encounter of 04/19/24 (from the past 24 hour(s))   POCT GLUCOSE   Result Value Ref Range    POCT Glucose 89 74 - 99 mg/dL   Urinalysis with Reflex Microscopic   Result Value Ref Range    Color, Urine Sandi (N) Straw, Yellow    Appearance, Urine Hazy (N) Clear    Specific Gravity, Urine 1.025 1.005 - 1.035    pH, Urine 5.0 5.0, 5.5, 6.0, 6.5, 7.0, 7.5, 8.0    Protein, Urine 30 (1+) (N) NEGATIVE mg/dL    Glucose, Urine NEGATIVE NEGATIVE mg/dL    Blood, Urine SMALL (1+) (A) NEGATIVE    Ketones, Urine NEGATIVE NEGATIVE mg/dL    Bilirubin, Urine NEGATIVE NEGATIVE    Urobilinogen, Urine 2.0 (N) <2.0 mg/dL    Nitrite, Urine NEGATIVE NEGATIVE    Leukocyte Esterase, Urine LARGE (3+)  (A) NEGATIVE   Urine electrolytes   Result Value Ref Range    Sodium, Urine Random 62 mmol/L    Sodium/Creatinine Ratio 67 Not established. mmol/g Creat    Potassium, Urine Random 62 mmol/L    Potassium/Creatinine Ratio 67 Not established mmol/g Creat    Chloride, Urine Random 45 mmol/L    Chloride/Creatinine Ratio 48 38 - 318 mmol/g creat    Creatinine, Urine Random 93.1 20.0 - 320.0 mg/dL   Albumin, Urine Random   Result Value Ref Range    Albumin, Urine Random 94.3 Not established mg/L    Creatinine, Urine Random 93.1 20.0 - 320.0 mg/dL    Albumin/Creatine Ratio 101.3 (H) <30.0 ug/mg Creat   Microscopic Only, Urine   Result Value Ref Range    WBC, Urine >50 (A) 1-5, NONE /HPF    RBC, Urine 6-10 (A) NONE, 1-2, 3-5 /HPF    Squamous Epithelial Cells, Urine 1-9 (SPARSE) Reference range not established. /HPF    Bacteria, Urine 2+ (A) NONE SEEN /HPF    Amorphous Crystals, Urine 3+ (A) NONE, 1+, 2+ /HPF   Legionella Antigen, Urine    Specimen: Urine   Result Value Ref Range    L. pneumophila Urine Ag Negative Negative   Streptococcus pneumoniae Antigen, Urine    Specimen: Urine   Result Value Ref Range    Streptococcus pneumoniae Ag, Urine Negative Negative   POCT GLUCOSE   Result Value Ref Range    POCT Glucose 74 74 - 99 mg/dL   Vancomycin, Trough   Result Value Ref Range    Vancomycin, Trough 5.9 5.0 - 20.0 ug/mL   Uric Acid   Result Value Ref Range    Uric Acid 6.1 2.3 - 6.7 mg/dL   Renal Function Panel   Result Value Ref Range    Glucose 114 (H) 74 - 99 mg/dL    Sodium 140 136 - 145 mmol/L    Potassium 4.2 3.5 - 5.3 mmol/L    Chloride 110 (H) 98 - 107 mmol/L    Bicarbonate 23 21 - 32 mmol/L    Anion Gap 11 10 - 20 mmol/L    Urea Nitrogen 43 (H) 6 - 23 mg/dL    Creatinine 1.68 (H) 0.50 - 1.05 mg/dL    eGFR 28 (L) >60 mL/min/1.73m*2    Calcium 7.7 (L) 8.6 - 10.3 mg/dL    Phosphorus 3.6 2.5 - 4.9 mg/dL    Albumin 1.9 (L) 3.4 - 5.0 g/dL   Lavender Top   Result Value Ref Range    Extra Tube Hold for add-ons.         Results from last 7 days   Lab Units 04/21/24  0623 04/20/24  0641 04/19/24  2217 04/19/24  1928   PROTEIN TOTAL g/dL  --   --   --  6.3*   BILIRUBIN TOTAL mg/dL  --   --   --  1.2   ALK PHOS U/L  --   --   --  130   ALT U/L  --   --   --  16   AST U/L  --   --   --  25   GLUCOSE mg/dL 114*   < >  --  92   BNP pg/mL  --   --  1,113*  --     < > = values in this interval not displayed.       Assessment/Plan      4/21/2024    1.  Pleural effusions elevated BNP.  Possible congestive heart failure.  No other clinical symptomatology.  The patient was admitted with cellulitis of her foot.  Await echocardiography.  She is not in any respiratory distress.    2.  Check an EKG    All history was obtained from the son at the bedside who translates    Viridiana Burgos MD

## 2024-04-21 NOTE — PROGRESS NOTES
Vancomycin Dosing by Pharmacy- FOLLOW UP    Joann Blank is a 89 y.o. year old female who Pharmacy has been consulted for vancomycin dosing for cellulitis, skin and soft tissue. Based on the patient's indication and renal status this patient is being dosed based on a goal trough/random level of 10-15.     Renal function is currently stable, but poor.    Current vancomycin dose: 500 mg given once on 4/19/24 @ 2056    Most recent trough level: 5.9 mcg/mL on 4/20/24 @ 1951.    Visit Vitals  /79 (BP Location: Left arm)   Pulse 73   Temp 36.1 °C (97 °F) (Temporal)   Resp 16        Lab Results   Component Value Date    CREATININE 1.81 (H) 04/20/2024    CREATININE 1.86 (H) 04/19/2024    CREATININE 0.92 09/30/2021    CREATININE 0.75 02/06/2019      Patient weight is 36.3 kg    Assessment/Plan    Below goal trough level. Order placed for vancomycin 500 mg iv x1 dose to be given on 4/20/24 @ 2200.  The next level will be obtained on 4/21/24 @ 2100. May be obtained sooner if clinically indicated. Pharmacist will determine further dosing based on that level.   Will continue to monitor renal function daily while on vancomycin and order serum creatinine at least every 48 hours if not already ordered.  Follow for continued vancomycin needs, clinical response, and signs/symptoms of toxicity.       Med La, PharmD

## 2024-04-21 NOTE — CONSULTS
Reason For Consult  Advanced Care Planning     History Of Present Illness   89-year-old Maltese speaking female with past medical history of cognitive impairment, severe protein calorie malnutrition, hypertension, osteoporosis, recurrent urinary tract infections who presented to the emergency room from home for right foot wounds.  On presentation, blood workup showed leukocytosis, lactic acidosis, hypoalbuminemia, and an acute kidney injury.  Patient was admitted to the medical service.  Started on IV antibiotics.  Podiatry consulted.  Palliative care consulted for advanced care planning.    Upon encounter, patient resting comfortably in her bed.  Awake and alert.  Pleasant.  Okay redden speaking.  She does follow her commands.  Not in acute distress.  Rest of the history was taken from the family at bedside.  Patient's lives with her son.  And granddaughter is involved in the care.  Per the family, at baseline, patient was still ambulating but with assistance.  Still continent.  But requiring assistance with all of her ADLs including feeding.  Appetite has been poor and she has been having ongoing weight loss but family also noted that since she was a child, she always has a poor oral intake and she was always skinny.  Regarding her orientation, it is still intact and according to the family, she would still engage with them fully in conversation.  She however started having sharp decline in the past months.  Walking less.  Spending most of the time in chair or in bed.  On the other hand, family noted that appetite stimulants were tried in the past with no help.       Review of Systems  10 systems were reviewed and were negative except for those noted in the history of present illness.     Past Medical History  She has a past medical history of Personal history of other diseases of the circulatory system.  Pertinent medical history also documented in my above narrative    Surgical History  She has no past  surgical history on file.  Pertinent surgical history also documented in my above narrative     Social History  She reports that she has never smoked. She has never been exposed to tobacco smoke. She has never used smokeless tobacco. No history on file for alcohol use and drug use.    Family History  No family history on file.     Allergies  Patient has no known allergies.    Home Medications  Medications Prior to Admission   Medication Sig Dispense Refill Last Dose    cranberry 500 mg capsule Take 500 mg by mouth once daily. Patient has home med       donepezil (Aricept) 5 mg tablet Take 1 tablet (5 mg) by mouth once daily.       losartan (Cozaar) 50 mg tablet Take 1 tablet (50 mg) by mouth once daily.       traZODone (Desyrel) 50 mg tablet Take 1 tablet (50 mg) by mouth once daily at bedtime. Patient takes 1-2 tablets by mouth at bedtime           Current Hospital Medications    Current Facility-Administered Medications:     acetaminophen (Tylenol) tablet 650 mg, 650 mg, oral, q4h PRN, Destini Davenport APRN-CNP, 650 mg at 04/21/24 0218    cranberry capsule 500 mg, 500 mg, oral, Daily, Sofy Johnson APRN-CNP    dextrose 50 % injection 12.5 g, 12.5 g, intravenous, q15 min PRN, Destini Davenport APRN-CNP    dextrose 50 % injection 25 g, 25 g, intravenous, q15 min PRN, Destini Davenport APRN-CNP    donepezil (Aricept) tablet 5 mg, 5 mg, oral, Daily, Sofy Johnson APRN-CNP    glucagon (Glucagen) injection 1 mg, 1 mg, intramuscular, q15 min PRN, Destini Davenport APRN-CNP    glucagon (Glucagen) injection 1 mg, 1 mg, intramuscular, q15 min PRN, Destini Davenport APRN-CNP    hydrALAZINE (Apresoline) injection 5 mg, 5 mg, intravenous, q6h PRN, ALBIN Naik-CNP    [Held by provider] losartan (Cozaar) tablet 50 mg, 50 mg, oral, Daily, Sofy Johnson APRN-CNP    piperacillin-tazobactam-dextrose (Zosyn) IV 2.25 g, 2.25 g, intravenous, q6h, Destini Davenport, ALBIN-CNP, Stopped at 04/21/24 1019    " polyethylene glycol (Glycolax, Miralax) packet 17 g, 17 g, oral, Daily PRN, Annalise LEVY TERI Mosley    traZODone (Desyrel) tablet 50 mg, 50 mg, oral, Nightly, Sofy Johnson, APRN-CNP    vancomycin (Vancocin) pharmacy to dose - pharmacy monitoring, , miscellaneous, Daily PRN, Destini JUNITO Ethel, APRN-CNP       Physical Exam  Physical Exam  Constitutional:       General: She is not in acute distress.     Appearance: She is not ill-appearing.      Comments: Awake alert and oriented x3.  Belarusian speaking.  She does follow verbal commands.  She is very frail looking and cachectic   HENT:      Mouth/Throat:      Pharynx: Oropharynx is clear.   Eyes:      Pupils: Pupils are equal, round, and reactive to light.   Cardiovascular:      Rate and Rhythm: Normal rate and regular rhythm.      Heart sounds: Normal heart sounds.   Pulmonary:      Effort: No respiratory distress.      Breath sounds: Normal breath sounds. No wheezing or rhonchi.   Abdominal:      General: Abdomen is flat. Bowel sounds are normal. There is no distension.      Palpations: Abdomen is soft.      Tenderness: There is no abdominal tenderness.   Musculoskeletal:         General: No swelling.      Comments: Significant muscle wasting noted.  Bilateral feet wound dressings that are dry clean and intact   Skin:     General: Skin is dry.      Coloration: Skin is pale.   Neurological:      General: No focal deficit present.   Psychiatric:         Mood and Affect: Mood normal.         Behavior: Behavior normal.           Last Recorded Vitals  Blood pressure 137/82, pulse 67, temperature 36.5 °C (97.7 °F), temperature source Temporal, resp. rate 16, height 1.575 m (5' 2\"), weight (!) 36.3 kg (80 lb), SpO2 99%.    Relevant Results  Results for orders placed or performed during the hospital encounter of 04/19/24 (from the past 24 hour(s))   POCT GLUCOSE   Result Value Ref Range    POCT Glucose 89 74 - 99 mg/dL   Urinalysis with Reflex Microscopic   Result Value Ref " Range    Color, Urine Sandi (N) Straw, Yellow    Appearance, Urine Hazy (N) Clear    Specific Gravity, Urine 1.025 1.005 - 1.035    pH, Urine 5.0 5.0, 5.5, 6.0, 6.5, 7.0, 7.5, 8.0    Protein, Urine 30 (1+) (N) NEGATIVE mg/dL    Glucose, Urine NEGATIVE NEGATIVE mg/dL    Blood, Urine SMALL (1+) (A) NEGATIVE    Ketones, Urine NEGATIVE NEGATIVE mg/dL    Bilirubin, Urine NEGATIVE NEGATIVE    Urobilinogen, Urine 2.0 (N) <2.0 mg/dL    Nitrite, Urine NEGATIVE NEGATIVE    Leukocyte Esterase, Urine LARGE (3+) (A) NEGATIVE   Urine electrolytes   Result Value Ref Range    Sodium, Urine Random 62 mmol/L    Sodium/Creatinine Ratio 67 Not established. mmol/g Creat    Potassium, Urine Random 62 mmol/L    Potassium/Creatinine Ratio 67 Not established mmol/g Creat    Chloride, Urine Random 45 mmol/L    Chloride/Creatinine Ratio 48 38 - 318 mmol/g creat    Creatinine, Urine Random 93.1 20.0 - 320.0 mg/dL   Albumin, Urine Random   Result Value Ref Range    Albumin, Urine Random 94.3 Not established mg/L    Creatinine, Urine Random 93.1 20.0 - 320.0 mg/dL    Albumin/Creatine Ratio 101.3 (H) <30.0 ug/mg Creat   Microscopic Only, Urine   Result Value Ref Range    WBC, Urine >50 (A) 1-5, NONE /HPF    RBC, Urine 6-10 (A) NONE, 1-2, 3-5 /HPF    Squamous Epithelial Cells, Urine 1-9 (SPARSE) Reference range not established. /HPF    Bacteria, Urine 2+ (A) NONE SEEN /HPF    Amorphous Crystals, Urine 3+ (A) NONE, 1+, 2+ /HPF   Legionella Antigen, Urine    Specimen: Urine   Result Value Ref Range    L. pneumophila Urine Ag Negative Negative   Streptococcus pneumoniae Antigen, Urine    Specimen: Urine   Result Value Ref Range    Streptococcus pneumoniae Ag, Urine Negative Negative   POCT GLUCOSE   Result Value Ref Range    POCT Glucose 74 74 - 99 mg/dL   Vancomycin, Trough   Result Value Ref Range    Vancomycin, Trough 5.9 5.0 - 20.0 ug/mL   Uric Acid   Result Value Ref Range    Uric Acid 6.1 2.3 - 6.7 mg/dL   Renal Function Panel   Result Value  Ref Range    Glucose 114 (H) 74 - 99 mg/dL    Sodium 140 136 - 145 mmol/L    Potassium 4.2 3.5 - 5.3 mmol/L    Chloride 110 (H) 98 - 107 mmol/L    Bicarbonate 23 21 - 32 mmol/L    Anion Gap 11 10 - 20 mmol/L    Urea Nitrogen 43 (H) 6 - 23 mg/dL    Creatinine 1.68 (H) 0.50 - 1.05 mg/dL    eGFR 28 (L) >60 mL/min/1.73m*2    Calcium 7.7 (L) 8.6 - 10.3 mg/dL    Phosphorus 3.6 2.5 - 4.9 mg/dL    Albumin 1.9 (L) 3.4 - 5.0 g/dL   Lavender Top   Result Value Ref Range    Extra Tube Hold for add-ons.         Imaging  MR foot right wo IV contrast    Result Date: 4/21/2024  Interpreted By:  Brent Giraldo, STUDY: MRI of the  right foot without IV contrast;  4/20/2024 2:58 pm   INDICATION: Signs/Symptoms:pressure ulcerations right foot. Xr showed focal gas.   COMPARISON: None.   ACCESSION NUMBER(S): [id number]   ORDERING CLINICIAN: EDILSON ALEGRE   TECHNIQUE: MR imaging of the  right foot was obtained  without administration of intravenous contrast medium. The patient is limited by motion. Effusion consist were not able to be completed as the patient was unable to tolerate the exam.   FINDINGS: Skin and soft tissue defect present in the posterior aspect of the calcaneus. There is associated cellulitis. No definite marrow edema or loss of the T1 signal seen on these views.   Markedly limited evaluation of the forefoot due to motion. There is diffuse soft tissue edema in the forefoot especially dorsally. No discrete soft tissue collection seen.   No evidence of abnormal marrow edema or osteomyelitis by MRI in the 4th toe.   Moderate muscle in the plantar musculature suggestive of ischemic myopathy.   Evaluation of tendons or ligaments is markedly limited due to the limitations of the study. No severe tenosynovitis seen. There is no fracture in the visualized osseous structures       Study is limited and incomplete as the patient was unable to tolerate the exam.   1. Within this limitation no evidence of osteomyelitis in the  posterior calcaneus or the 4th digit. Ulcers are present at these 2 locations with associated cellulitis and phlegmonous changes. If there is persistent clinical concern however repeat MR imaging with sedation can be performed 2. 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 This study was interpreted at [place], [place].   MACRO: None   Signed by: Brent Giraldo 4/21/2024 9:49 AM Dictation workstation:   [id number]    XR chest 1 view    Result Date: 4/20/2024  Interpreted By:  Leonel Paiz, STUDY: XR CHEST 1 VIEW; 4/20/2024 6:57 am   INDICATION: Signs/Symptoms:elevated bnp   COMPARISON: July 2011   ACCESSION NUMBER(S): [id number]   ORDERING CLINICIAN: ANGELY SERNA   FINDINGS: The study is limited due to rotation, respiratory motion and underpenetration. The cardiac silhouette appears enlarged, exaggerated by the technique. The aorta is ectatic and tortuous. Bilateral infiltrates and pleural effusions are present, right more than left. There is no pneumothorax. Degenerative changes involve the spine and shoulders.       Limited exam. Bibasilar infiltrates and pleural effusions, right more than left, possibly due to pulmonary edema or bilateral pneumonia. Correlate clinically and follow-up as needed.   Signed by: Leonel Paiz 4/20/2024 8:33 AM Dictation workstation:   [id number]    XR foot 3+ views bilateral    Result Date: 4/19/2024  Interpreted By:  Carol Briones, STUDY: XR FOOT 3+ VIEWS BILATERAL; ;  4/19/2024 7:13 pm   INDICATION: Signs/Symptoms:foot pain and blackened 5th toe area.   COMPARISON: None.   ACCESSION NUMBER(S): [id number]   ORDERING CLINICIAN: ROOSEVELT BEDOLLA   FINDINGS: Three views left foot: There is osteopenia. No acute fracture or malalignment. Mild midfoot degenerative changes. Mild diffuse soft tissue edema. No soft tissue gas or radiopaque foreign  body.   Three views right foot: There is osteopenia. No acute fracture or malalignment. No osseous destruction or abnormal periosteal bone formation. Hallux valgus deformity and mild 1st metatarsophalangeal osteoarthrosis are noted. There is focal soft tissue gas involving the lateral aspect of the base of the 5th toe, suggesting ulceration. No radiopaque foreign body. Diffuse soft tissue edema noted.       No acute osseous abnormality.   Focal soft tissue gas involving the lateral base of the 5th toe suggesting ulceration. No radiographic evidence of osteomyelitis.   Additional chronic findings as above.   MACRO: None   Signed by: Carol Briones 4/19/2024 7:34 PM Dictation workstation:   [id number]          Assessment/Plan     89-year-old Northern Irish speaking female with past medical history of cognitive impairment, severe protein calorie malnutrition, hypertension, osteoporosis, recurrent urinary tract infections who presented to the emergency room from home for right foot wounds.  On presentation, blood workup showed leukocytosis, lactic acidosis, hypoalbuminemia, and an acute kidney injury.  Patient was admitted to the medical service.  Started on IV antibiotics.  Podiatry consulted.  Palliative care consulted for advanced care planning.    I had a very lengthy discussion with the family at bedside.  I started the meeting by introducing the practice of palliative care and the services it provides.  I then reviewed at length with patient/family the clinical diagnosis/medical problems that the patient presented with and the treatments provided so far. We discussed the implications of the current circumstances and option plans going forward.  We then reviewed at length the nature of the patient's primary disease--severe protein calorie malnutrition and cognitive impairment, its progression, and potential complications in the long run.  I then tried to my best answering all questions patient/family had and attending  to their concerns via reflective listening.    I told the family that based on the information that they provided me, and unless proven otherwise, patient most likely has Alzheimers disease that is moderate stage.  Fast score 6c and PPS 30%.  But despite having the moderate stage of the disease, her overall prognosis is guarded given her underlying severe protein calorie malnutrition and significant functional limitation.  Family demonstrated understanding.  Family wanted to know more about options of discharge and placement after this hospitalization.  I told them that for now, we will need to wait until further input from podiatry.  MRI has been ordered.  And we need to rule out bone involvement from the infection.  If there is a bone involvement, then patient may require long-term antibiotic therapy intravenously.  And in such a case, we will need to explore options on whether this can be given at home or not especially that family wishes to have the patient go home.  So I presented him with the following options depending on the outcomes.    1.  Home health care with palliative care follow-up; and I did explain in details what such services provided.  But again, this will all depend on the outpatient wound management recommendation by podiatry and whether this can be managed at home or not.  Also whether we will need IV antibiotics or not.    2.  Hospice care.  I introduced the philosophy of hospice care and the services it provides.  I explained how hospice attends to patient's quality of life and dignity while the disease takes its natural course.  I emphasized how hospice focuses on decreasing the burden of the disease and providing comfort not only for the patient but also for the family and caregivers.    Family demonstrated good understanding of all the information I provided.  We will wait till Monday and decide on the final step.    I also discussed with them the advance directives.  They said that  patient does have a DNR CCA and her papers.  So I did change her CODE STATUS.  On the other hand, she does not have advanced directives or healthcare proxy.  So I told them in such a case could, it would be her son as her next of kin.  I did update  on call today regarding the conversation I had.    Thank you for allowing us to participate in the care of this lady.  Should you have any further questions or concerns regarding her care, please do not hesitate to contact us via [employer] (Ana Baez during weekdays work hours and Rajinder Reynolds during weekdays after hours and over the weekend)    (This note was generated with voice recognition software and may contain errors including spelling, grammar, syntax and misrecognition of what was dictated, that are not fully corrected)      Rajinder Reynolds MD

## 2024-04-21 NOTE — CARE PLAN
The patient's goals for the shift include      The clinical goals for the shift include Pt will remain safe throughout shift    Over the shift, the patient did not make progress toward the following goals. Barriers to progression include acute illness. Recommendations to address these barriers include communication.

## 2024-04-22 ENCOUNTER — APPOINTMENT (OUTPATIENT)
Dept: RADIOLOGY | Facility: HOSPITAL | Age: 89
DRG: 592 | End: 2024-04-22
Payer: COMMERCIAL

## 2024-04-22 ENCOUNTER — APPOINTMENT (OUTPATIENT)
Dept: CARDIOLOGY | Facility: HOSPITAL | Age: 89
DRG: 592 | End: 2024-04-22
Payer: COMMERCIAL

## 2024-04-22 LAB
ALBUMIN SERPL BCP-MCNC: 1.8 G/DL (ref 3.4–5)
ALP SERPL-CCNC: 86 U/L (ref 33–136)
ALT SERPL W P-5'-P-CCNC: 10 U/L (ref 7–45)
ANION GAP SERPL CALC-SCNC: 12 MMOL/L (ref 10–20)
AST SERPL W P-5'-P-CCNC: 21 U/L (ref 9–39)
BILIRUB SERPL-MCNC: 0.8 MG/DL (ref 0–1.2)
BUN SERPL-MCNC: 37 MG/DL (ref 6–23)
CALCIUM SERPL-MCNC: 7.7 MG/DL (ref 8.6–10.3)
CHLORIDE SERPL-SCNC: 111 MMOL/L (ref 98–107)
CO2 SERPL-SCNC: 18 MMOL/L (ref 21–32)
CREAT SERPL-MCNC: 1.47 MG/DL (ref 0.5–1.05)
EGFRCR SERPLBLD CKD-EPI 2021: 33 ML/MIN/1.73M*2
ERYTHROCYTE [DISTWIDTH] IN BLOOD BY AUTOMATED COUNT: 14.6 % (ref 11.5–14.5)
GLUCOSE BLD MANUAL STRIP-MCNC: 87 MG/DL (ref 74–99)
GLUCOSE SERPL-MCNC: 101 MG/DL (ref 74–99)
HCT VFR BLD AUTO: 39.4 % (ref 36–46)
HGB BLD-MCNC: 11.8 G/DL (ref 12–16)
MCH RBC QN AUTO: 28.9 PG (ref 26–34)
MCHC RBC AUTO-ENTMCNC: 29.9 G/DL (ref 32–36)
MCV RBC AUTO: 97 FL (ref 80–100)
NRBC BLD-RTO: 0 /100 WBCS (ref 0–0)
PLATELET # BLD AUTO: 133 X10*3/UL (ref 150–450)
POTASSIUM SERPL-SCNC: 4 MMOL/L (ref 3.5–5.3)
PROT SERPL-MCNC: 5 G/DL (ref 6.4–8.2)
RBC # BLD AUTO: 4.08 X10*6/UL (ref 4–5.2)
SODIUM SERPL-SCNC: 137 MMOL/L (ref 136–145)
VANCOMYCIN TROUGH SERPL-MCNC: 12.1 UG/ML (ref 5–20)
WBC # BLD AUTO: 8.8 X10*3/UL (ref 4.4–11.3)

## 2024-04-22 PROCEDURE — 96372 THER/PROPH/DIAG INJ SC/IM: CPT | Performed by: INTERNAL MEDICINE

## 2024-04-22 PROCEDURE — 93306 TTE W/DOPPLER COMPLETE: CPT | Performed by: INTERNAL MEDICINE

## 2024-04-22 PROCEDURE — 71045 X-RAY EXAM CHEST 1 VIEW: CPT | Performed by: RADIOLOGY

## 2024-04-22 PROCEDURE — 2500000004 HC RX 250 GENERAL PHARMACY W/ HCPCS (ALT 636 FOR OP/ED): Performed by: INTERNAL MEDICINE

## 2024-04-22 PROCEDURE — 2500000004 HC RX 250 GENERAL PHARMACY W/ HCPCS (ALT 636 FOR OP/ED)

## 2024-04-22 PROCEDURE — 80053 COMPREHEN METABOLIC PANEL: CPT | Performed by: INTERNAL MEDICINE

## 2024-04-22 PROCEDURE — 36415 COLL VENOUS BLD VENIPUNCTURE: CPT | Performed by: INTERNAL MEDICINE

## 2024-04-22 PROCEDURE — 85027 COMPLETE CBC AUTOMATED: CPT | Performed by: INTERNAL MEDICINE

## 2024-04-22 PROCEDURE — 82947 ASSAY GLUCOSE BLOOD QUANT: CPT

## 2024-04-22 PROCEDURE — 93306 TTE W/DOPPLER COMPLETE: CPT

## 2024-04-22 PROCEDURE — 2500000004 HC RX 250 GENERAL PHARMACY W/ HCPCS (ALT 636 FOR OP/ED): Performed by: NURSE PRACTITIONER

## 2024-04-22 PROCEDURE — 2500000001 HC RX 250 WO HCPCS SELF ADMINISTERED DRUGS (ALT 637 FOR MEDICARE OP)

## 2024-04-22 PROCEDURE — 1100000001 HC PRIVATE ROOM DAILY

## 2024-04-22 PROCEDURE — 2500000001 HC RX 250 WO HCPCS SELF ADMINISTERED DRUGS (ALT 637 FOR MEDICARE OP): Performed by: INTERNAL MEDICINE

## 2024-04-22 PROCEDURE — 92610 EVALUATE SWALLOWING FUNCTION: CPT | Mod: GN

## 2024-04-22 PROCEDURE — 80202 ASSAY OF VANCOMYCIN: CPT | Performed by: NURSE PRACTITIONER

## 2024-04-22 PROCEDURE — 2500000001 HC RX 250 WO HCPCS SELF ADMINISTERED DRUGS (ALT 637 FOR MEDICARE OP): Performed by: NURSE PRACTITIONER

## 2024-04-22 PROCEDURE — 99233 SBSQ HOSP IP/OBS HIGH 50: CPT | Performed by: INTERNAL MEDICINE

## 2024-04-22 PROCEDURE — 2500000004 HC RX 250 GENERAL PHARMACY W/ HCPCS (ALT 636 FOR OP/ED): Performed by: OCCUPATIONAL THERAPIST

## 2024-04-22 PROCEDURE — 71045 X-RAY EXAM CHEST 1 VIEW: CPT

## 2024-04-22 RX ORDER — TRAMADOL HYDROCHLORIDE 50 MG/1
50 TABLET ORAL 3 TIMES DAILY PRN
Status: DISCONTINUED | OUTPATIENT
Start: 2024-04-22 | End: 2024-04-24 | Stop reason: HOSPADM

## 2024-04-22 RX ORDER — BALSAM PERU/CASTOR OIL
OINTMENT (GRAM) TOPICAL 2 TIMES DAILY
Status: DISCONTINUED | OUTPATIENT
Start: 2024-04-22 | End: 2024-04-24 | Stop reason: HOSPADM

## 2024-04-22 RX ORDER — VANCOMYCIN HYDROCHLORIDE 1 G/200ML
1 INJECTION, SOLUTION INTRAVENOUS ONCE
Status: COMPLETED | OUTPATIENT
Start: 2024-04-22 | End: 2024-04-23

## 2024-04-22 RX ORDER — TRAMADOL HYDROCHLORIDE 50 MG/1
50 TABLET ORAL ONCE
Status: COMPLETED | OUTPATIENT
Start: 2024-04-22 | End: 2024-04-22

## 2024-04-22 RX ORDER — FUROSEMIDE 10 MG/ML
20 INJECTION INTRAMUSCULAR; INTRAVENOUS ONCE
Status: COMPLETED | OUTPATIENT
Start: 2024-04-22 | End: 2024-04-22

## 2024-04-22 RX ADMIN — DONEPEZIL HYDROCHLORIDE 5 MG: 5 TABLET, FILM COATED ORAL at 10:29

## 2024-04-22 RX ADMIN — PIPERACILLIN SODIUM AND TAZOBACTAM SODIUM 2.25 G: 2; .25 INJECTION, SOLUTION INTRAVENOUS at 16:30

## 2024-04-22 RX ADMIN — HEPARIN SODIUM 5000 UNITS: 5000 INJECTION INTRAVENOUS; SUBCUTANEOUS at 21:00

## 2024-04-22 RX ADMIN — PIPERACILLIN SODIUM AND TAZOBACTAM SODIUM 2.25 G: 2; .25 INJECTION, SOLUTION INTRAVENOUS at 01:43

## 2024-04-22 RX ADMIN — VANCOMYCIN HYDROCHLORIDE 1 G: 1 INJECTION, SOLUTION INTRAVENOUS at 23:09

## 2024-04-22 RX ADMIN — HYDRALAZINE HYDROCHLORIDE 5 MG: 20 INJECTION INTRAMUSCULAR; INTRAVENOUS at 21:41

## 2024-04-22 RX ADMIN — TRAZODONE HYDROCHLORIDE 50 MG: 50 TABLET ORAL at 21:00

## 2024-04-22 RX ADMIN — PIPERACILLIN SODIUM AND TAZOBACTAM SODIUM 2.25 G: 2; .25 INJECTION, SOLUTION INTRAVENOUS at 10:28

## 2024-04-22 RX ADMIN — POLYETHYLENE GLYCOL 3350 17 G: 17 POWDER, FOR SOLUTION ORAL at 11:47

## 2024-04-22 RX ADMIN — FUROSEMIDE 20 MG: 10 INJECTION, SOLUTION INTRAMUSCULAR; INTRAVENOUS at 10:29

## 2024-04-22 RX ADMIN — HEPARIN SODIUM 5000 UNITS: 5000 INJECTION INTRAVENOUS; SUBCUTANEOUS at 14:01

## 2024-04-22 RX ADMIN — HEPARIN SODIUM 5000 UNITS: 5000 INJECTION INTRAVENOUS; SUBCUTANEOUS at 06:11

## 2024-04-22 RX ADMIN — TRAMADOL HYDROCHLORIDE 50 MG: 50 TABLET, COATED ORAL at 00:44

## 2024-04-22 RX ADMIN — Medication 500 MG: at 09:00

## 2024-04-22 RX ADMIN — PIPERACILLIN SODIUM AND TAZOBACTAM SODIUM 2.25 G: 2; .25 INJECTION, SOLUTION INTRAVENOUS at 21:00

## 2024-04-22 RX ADMIN — ACETAMINOPHEN 650 MG: 325 TABLET ORAL at 16:30

## 2024-04-22 RX ADMIN — CASTOR OIL AND BALSAM, PERU: 788; 87 OINTMENT TOPICAL at 20:40

## 2024-04-22 ASSESSMENT — COGNITIVE AND FUNCTIONAL STATUS - GENERAL
DAILY ACTIVITIY SCORE: 6
DRESSING REGULAR UPPER BODY CLOTHING: TOTAL
TOILETING: TOTAL
MOVING TO AND FROM BED TO CHAIR: TOTAL
HELP NEEDED FOR BATHING: TOTAL
EATING MEALS: TOTAL
STANDING UP FROM CHAIR USING ARMS: TOTAL
WALKING IN HOSPITAL ROOM: TOTAL
PERSONAL GROOMING: TOTAL
TURNING FROM BACK TO SIDE WHILE IN FLAT BAD: TOTAL
EATING MEALS: TOTAL
HELP NEEDED FOR BATHING: TOTAL
MOVING FROM LYING ON BACK TO SITTING ON SIDE OF FLAT BED WITH BEDRAILS: TOTAL
STANDING UP FROM CHAIR USING ARMS: TOTAL
CLIMB 3 TO 5 STEPS WITH RAILING: TOTAL
DAILY ACTIVITIY SCORE: 6
MOBILITY SCORE: 6
MOVING FROM LYING ON BACK TO SITTING ON SIDE OF FLAT BED WITH BEDRAILS: TOTAL
TOILETING: TOTAL
MOBILITY SCORE: 6
TURNING FROM BACK TO SIDE WHILE IN FLAT BAD: TOTAL
DRESSING REGULAR LOWER BODY CLOTHING: TOTAL
DRESSING REGULAR UPPER BODY CLOTHING: TOTAL
DRESSING REGULAR LOWER BODY CLOTHING: TOTAL
PERSONAL GROOMING: TOTAL
MOVING TO AND FROM BED TO CHAIR: TOTAL
WALKING IN HOSPITAL ROOM: TOTAL
CLIMB 3 TO 5 STEPS WITH RAILING: TOTAL

## 2024-04-22 ASSESSMENT — PAIN - FUNCTIONAL ASSESSMENT
PAIN_FUNCTIONAL_ASSESSMENT: 0-10
PAIN_FUNCTIONAL_ASSESSMENT: 0-10

## 2024-04-22 ASSESSMENT — PAIN SCALES - GENERAL
PAINLEVEL_OUTOF10: 0 - NO PAIN
PAINLEVEL_OUTOF10: 0 - NO PAIN
PAINLEVEL_OUTOF10: 2

## 2024-04-22 NOTE — PROGRESS NOTES
Vancomycin Dosing by Pharmacy- FOLLOW UP    Joann Blank is a 89 y.o. year old female who Pharmacy has been consulted for vancomycin dosing for cellulitis, skin and soft tissue. Based on the patient's indication and renal status this patient is being dosed based on a goal trough/random level of 10-15.     Renal function is currently stable, but poor.     Current vancomycin dose: 500 mg given once on 4/21/24 @ 0025    Most recent random level: 9.2 mcg/mL on 4/21/24  @2021.    Visit Vitals  /74 (BP Location: Left arm, Patient Position: Lying)   Pulse 73   Temp 36.4 °C (97.5 °F) (Temporal)   Resp 18        Lab Results   Component Value Date    CREATININE 1.68 (H) 04/21/2024    CREATININE 1.81 (H) 04/20/2024    CREATININE 1.86 (H) 04/19/2024    CREATININE 0.92 09/30/2021    CREATININE 0.75 02/06/2019      Patient weight is 36.3 kg    Assessment/Plan    Below goal trough level. Order placed for vancomycin 500 mg iv x1 dose to be given on 4/21/24 @ 2200.  The next level will be obtained on 4/22/24 at 2100. May be obtained sooner if clinically indicated.   Will continue to monitor renal function daily while on vancomycin and order serum creatinine at least every 48 hours if not already ordered.  Follow for continued vancomycin needs, clinical response, and signs/symptoms of toxicity.       Med La, PharmD

## 2024-04-22 NOTE — PROGRESS NOTES
TCC Note: Was informed by RN that family is interested in Pall Med. Informed SW who met with family. Please see their note. Was asked to speak with family again regarding HHC. Met with family in depth regarding the fact that Pall Med would assume care for the patient at home and would explain their process, care, treatments, etc and answer all questions in depth. Will continue to follow until discharge. Misty Rasmussen, MSN, RN, TCC.

## 2024-04-22 NOTE — PROGRESS NOTES
Speech-Language Pathology    [facility] Adult Inpatient Speech-Language Pathology Clinical Swallow Evaluation    Patient Name: Joann Blank  MRN: [id number]  Today's Date: 4/22/2024   Time Calculation  Start Time: 1040  Stop Time: 1115  Time Calculation (min): 35 min         Current Problem:   1. Cellulitis of foot  CANCELED: Vascular US ankle brachial index (YUE) without exercise    CANCELED: Vascular US ankle brachial index (YUE) without exercise    CANCELED: Vascular US ankle brachial index (YUE) without exercise    CANCELED: Vascular US ankle brachial index (YUE) without exercise      2. Non-pressure chronic ulcer of other part of left foot limited to breakdown of skin (Multi)  CANCELED: Vascular US ankle brachial index (YUE) without exercise    CANCELED: Vascular US ankle brachial index (YUE) without exercise    CANCELED: Vascular US ankle brachial index (YUE) without exercise    CANCELED: Vascular US ankle brachial index (YUE) without exercise      3. Elevated brain natriuretic peptide (BNP) level  Transthoracic Echo (TTE) Complete    Transthoracic Echo (TTE) Complete    CANCELED: Transthoracic Echo (TTE) Complete    CANCELED: Transthoracic Echo (TTE) Complete      4. Acute pulmonary edema with congestive heart failure (Multi)  Transthoracic Echo (TTE) Complete    Transthoracic Echo (TTE) Complete    CANCELED: Transthoracic Echo (TTE) Complete    CANCELED: Transthoracic Echo (TTE) Complete        Assessment:  Patient is awake, pleasant and cooperative. Bundled under blankets d/t feeling cold. Patient with history of dementia. Granddaughter at bedside reports a decline in function recently. State that they have been providing patient with pureed foods and thickened liquids at home for about 1 year. Per chart review, patient declines MARTII use. Patient's granddaughter at bedside and providing translation d/t patient is Serbian speaking.     Patient is edentulous but smile is symmetrical with no evidence of any facial  weakness or drooping. Given intermittent verbal encouragement, patient was fed tsp sips of puree, whole pill x1 in liquid carrier, and tsp sips of mildly and moderately thick liquids. Patient taking small bites of puree but full tsp sips of thickened liquids. Prompt oral manipulation for all PO trials with timely A-P transit time and swallow onset. Patient often with facial grimace during swallow, which patient's granddaughter reports is normal. She says it seems like it is painful for her to swallow but patient does not report any pain. Granddaughter states that patient has never liked to eat ever since she was a little girl. Reports that she no longer likes foods/liquids sweet but asks for salt. Occasional double swallow noted, which may suggest possible pharyngeal residue. Frequent dry coughing observed following tsp sips of mildly thick liquids. Patient's granddaughter brought in premixed thickened water cups from home that were noted to be moderately thick. Patient was given full tsp sips of moderately thick water with no evidence of coughing or choking. Patient denying any globus sensation at this time. Whole pill provided in thickened liquid carrier with prompt A-P transit and pharyngeal clearance.      Recommendations:  Solid Diet Recommendations : Pureed/extremely thick  (IDDSI Level 4)  Liquid Diet Recommendations: Honey thick/liquidised/moderately thick (IDDS Level 3)  Compensatory Swallowing Strategies:   Upright 90 degrees as possible for all oral intake,   Remain upright for 20-30 minutes after meals,   One to one assist with meals,   Alternate solids and liquids,   Single sips,   Small bites/sips,   Eat/feed slowly    Medication Administration Recommendations: Whole, With Pureed or liquid carrier    Plan:  SLP Plan: No skilled SLP; patient is now on her baseline diet. Patient/family are considering palliative/hospice care; decline further swallowing assessment.  Discussed POC: Caregiver/family,  Nursing, Physician      Subjective   Current Problem:  Rule out aspiration and determine safest LRD      General Visit Information:  Patient Class: Inpatient  Living Environment: Home  Past Medical History Relevant to Rehab: Dementia, HTN, dysphagia  Prior to Session Communication: Bedside nurse    Patient admitted to [facility] from home for further assessment of right foot wounds. Blood work up showed leukocytosis, lactic acidosis, hypoalbuminemia and acute kidney injury.    Vital Signs:   Afebrile, O2 NC, 4/20 CXR showing Bibasilar infiltrates and pleural effusions, right more  than left, possibly due to pulmonary edema or bilateral pneumonia.      Objective       Baseline Assessment:  Respiratory Status: Oxygen via nasal cannula      Pain:  Pain Score: 0 - No pain       Oral/Motor Assessment:  Dentition: Edentulous  Oral Motor: Within Functional Limits    Clinical Observations:  Patient Positioning: Upright in Bed      Inpatient:  Education Comments  Results/recommendations were communicated with patient's family, nursing and physicians. Clinician discussed option to complete MBS to further assess oropharyngeal swallowing mechanism, however, patient's granddaughter declining any further testing. Patient's family is considering palliative/hospice care for patient.     Consultations/Referrals/Coordination of Services: Please re-consult ST if any new swallowing concerns arise or patient/family desire for more aggressive level of care which would include further instrumental assessment.

## 2024-04-22 NOTE — SIGNIFICANT EVENT
Patient refused the BLAZE. Family stays with patient at all times and helps with translation if needed

## 2024-04-22 NOTE — PROGRESS NOTES
Joann Blank is a 89 y.o. female on day 0 of admission presenting with Cellulitis of foot.      Subjective   SELMA with oliguria   CMP today BUN 37 creatinine 1.47  Hemoglobin 11.8 platelets 198477   WBC 8800  Albumin low 1.8  Chest x-ray result pending  Patient received 1 L of D5LR  Urine culture growing 20-80,000 gram-negative bacilli identification is pending  Objective          Vitals 24HR  Heart Rate:  [66-73]   Temp:  [36.1 °C (97 °F)-36.4 °C (97.5 °F)]   Resp:  [18]   BP: (170-179)/(74-84)   SpO2:  [97 %-99 %]         Intake/Output last 3 Shifts:    Intake/Output Summary (Last 24 hours) at 4/22/2024 1057  Last data filed at 4/22/2024 0143  Gross per 24 hour   Intake 1139 ml   Output 250 ml   Net 889 ml     Results for orders placed or performed during the hospital encounter of 04/19/24 (from the past 24 hour(s))   Vancomycin, Trough   Result Value Ref Range    Vancomycin, Trough 9.2 5.0 - 20.0 ug/mL   Comprehensive Metabolic Panel   Result Value Ref Range    Glucose 101 (H) 74 - 99 mg/dL    Sodium 137 136 - 145 mmol/L    Potassium 4.0 3.5 - 5.3 mmol/L    Chloride 111 (H) 98 - 107 mmol/L    Bicarbonate 18 (L) 21 - 32 mmol/L    Anion Gap 12 10 - 20 mmol/L    Urea Nitrogen 37 (H) 6 - 23 mg/dL    Creatinine 1.47 (H) 0.50 - 1.05 mg/dL    eGFR 33 (L) >60 mL/min/1.73m*2    Calcium 7.7 (L) 8.6 - 10.3 mg/dL    Albumin 1.8 (L) 3.4 - 5.0 g/dL    Alkaline Phosphatase 86 33 - 136 U/L    Total Protein 5.0 (L) 6.4 - 8.2 g/dL    AST 21 9 - 39 U/L    Bilirubin, Total 0.8 0.0 - 1.2 mg/dL    ALT 10 7 - 45 U/L   CBC   Result Value Ref Range    WBC 8.8 4.4 - 11.3 x10*3/uL    nRBC 0.0 0.0 - 0.0 /100 WBCs    RBC 4.08 4.00 - 5.20 x10*6/uL    Hemoglobin 11.8 (L) 12.0 - 16.0 g/dL    Hematocrit 39.4 36.0 - 46.0 %    MCV 97 80 - 100 fL    MCH 28.9 26.0 - 34.0 pg    MCHC 29.9 (L) 32.0 - 36.0 g/dL    RDW 14.6 (H) 11.5 - 14.5 %    Platelets 133 (L) 150 - 450 x10*3/uL       MR foot right wo IV contrast    Result Date: 4/21/2024  Interpreted  By:  Brent Giraldo, STUDY: MRI of the  right foot without IV contrast;  4/20/2024 2:58 pm   INDICATION: Signs/Symptoms:pressure ulcerations right foot. Xr showed focal gas.   COMPARISON: None.   ACCESSION NUMBER(S): [id number]   ORDERING CLINICIAN: EDILSON ALEGRE   TECHNIQUE: MR imaging of the  right foot was obtained  without administration of intravenous contrast medium. The patient is limited by motion. Effusion consist were not able to be completed as the patient was unable to tolerate the exam.   FINDINGS: Skin and soft tissue defect present in the posterior aspect of the calcaneus. There is associated cellulitis. No definite marrow edema or loss of the T1 signal seen on these views.   Markedly limited evaluation of the forefoot due to motion. There is diffuse soft tissue edema in the forefoot especially dorsally. No discrete soft tissue collection seen.   No evidence of abnormal marrow edema or osteomyelitis by MRI in the 4th toe.   Moderate muscle in the plantar musculature suggestive of ischemic myopathy.   Evaluation of tendons or ligaments is markedly limited due to the limitations of the study. No severe tenosynovitis seen. There is no fracture in the visualized osseous structures       Study is limited and incomplete as the patient was unable to tolerate the exam.   1. Within this limitation no evidence of osteomyelitis in the posterior calcaneus or the 4th digit. Ulcers are present at these 2 locations with associated cellulitis and phlegmonous changes. If there is persistent clinical concern however repeat MR imaging with sedation can be performed 2. 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 This study was interpreted at [place], [place].   MACRO: None   Signed by: Brent Giraldo 4/21/2024 9:49 AM Dictation workstation:   [id number]    Physical Exam  HEENT;  enophthalmos pupils react light accommodation  Neck; no JVD no bruit no thyromegaly  Lungs; positive crackles on inspiration no wheezing  Heart; no gallops or rubs regular rate  Abdomen; active peristalsis no rebound or guarding  Neurological; no tics or tremors no asterixis    Assessment/Plan    Acute kidney injury  Oliguria improving transaminitis  UTI  Pneumonia  Secondary hyperparathyroidism  Plan awaiting chest x-ray results  Continue monitor renal chemistries continue DuoNeb aerosols  Continue current antibiotics            Ciro Chávez MD

## 2024-04-22 NOTE — PROGRESS NOTES
"Joann Blank is a 89 y.o. female on day 0 of admission presenting with Cellulitis of foot.    Subjective   Pt seen at bedside. Granddaughter sitting bedside. Family questions were answered. No apparent overnight events. Has been elevating foot on pillow. Offloading boots intact. Pt denies any constitutional symptoms. No other pedal complaints at this time.        Physical Exam    Objective     REVIEW OF SYSTEMS  A 10+ point ROS was completed and otherwise negative except as noted above and per HPI.     Objective:   Vasc: DP and PT pulses are faintly bilateral.  CFT is less than 3 seconds bilateral.  Skin temperature is warm to cool proximal to distal bilateral.  erythema to the dorsum of the foot      Neuro:  Light touch is intact to the foot bilateral.  Protective sensation is intact to the foot when tested with the 5.07 SWM bilateral.  There is no clonus noted.  The hallux is downgoing bilateral.       Derm: Skin is supple with normal texture and turgor noted.  Webspaces are clean, dry and intact bilateral.  There are no hyperkeratoses, ulcerations, verruca or other lesions noted.  Pressure ulceration of right heel with necrotic cap that is well adhered, no fluctuance underneath. Periwound erythema. Pressure ulceration of right lateral 5th toe and metatarsal area with necrotic cap that is well adhered with no fluctuance. Periwound erythema noted. Left calf has start of eccymosis.      Ortho: Muscle strength is 5/5 for all pedal groups tested.  Ankle joint, subtalar joint, 1st MPJ and lesser MPJ ROM is full and without pain or crepitus.  The foot type is rectus bilateral off weight bearing.  There are no structural deformities noted.         Last Recorded Vitals  Blood pressure 166/83, pulse 80, temperature 35.8 °C (96.4 °F), resp. rate 18, height 1.575 m (5' 2\"), weight (!) 36.3 kg (80 lb), SpO2 94%.    Intake/Output last 3 Shifts:  I/O last 3 completed shifts:  In: 1139 (31.4 mL/kg) [P.O.:580; I.V.:309 (8.5 " mL/kg); IV Piggyback:250]  Out: 250 (6.9 mL/kg) [Urine:250 (0.2 mL/kg/hr)]  Dosing Weight: 36.3 kg     WBC downtrending, 9.8  Lactate down trending 2.5  Elevated CRP, normal ESR  XR showed no osseous involvement, though soft tissue focal gas on lateral right pressure area consistent with ulcer  MRI not tolerated, but no OM on images seen.    Assessment/Plan   Principal Problem:    Cellulitis of foot  # Unstageable pressure ulceration, right heel  # Unstageable pressure ulceration, right lateral foot  # unstageable pressuer ulceration, left heel   # DTI left calf  # Cellulitis, right foot  # PAD     -Patient was seen and evaluated; all findings were discussed and all questions were answered to patient's satisfaction.  - Charts, labs, vitals and imaging all reviewed.   - Imaging:  Read noted above. No evidence of osteomyelitis in the posterior calcaneus or the 4th digit  - Labs: reviewed  - PVRs: pending   - Abx:  vanc/zosyn     RECOMMENDATIONS:   - Continue with IV abx per primary, responding well  - Prevlon off loading boots ordered, need to offload legs and pressured areas  - PVRs ordered to assess possible underlying vascular disease  - MRI right foot reviewed, no OM seen on images captured. MRI not tolerated  - Currently no surgical intervention, due to family expectations and age, most likely will not elect surgery.   - Per chart review, Referrals were placed to Hospice. Social Work following  - Planned conservative treatment with daily dressing changes of betadine to keep wound dry and stable  - Dressings: betadine wtd  - Nursing staff is able to change/reinforce dressing if & as necessary until next day’s dressing change. Thank you.  - will continue following while in house      Case to be discussed with attending, A&P above reflect tentative plan. Please await for final signature from attending physician on service.     Ashu Sahu, JEM PGY-1  Podiatric Medicine & Surgery  [place]

## 2024-04-22 NOTE — PROGRESS NOTES
Cardiology Progress Note      Joann Blank is a 89 y.o. female on day 0 of admission presenting with Cellulitis of foot.      Subjective   Per the granddaughter, the patient only had been through the night with pain in the foot.  Better with Ultram.  Denies chest pain or pressure no shortness of breath       ROS:  10 systems reviewed other than what is mentioned above.     MEDICATION:    Scheduled medications  cranberry, 500 mg, oral, Daily  donepezil, 5 mg, oral, Daily  furosemide, 20 mg, intravenous, Once  heparin (porcine), 5,000 Units, subcutaneous, q8h ESVIN  [Held by provider] losartan, 50 mg, oral, Daily  piperacillin-tazobactam, 2.25 g, intravenous, q6h  traZODone, 50 mg, oral, Nightly      Continuous medications     PRN medications  PRN medications: acetaminophen, dextrose, dextrose, glucagon, glucagon, hydrALAZINE, ipratropium-albuteroL, polyethylene glycol, vancomycin     Principal Problem:    Cellulitis of foot      OBJECTIVE:    Visit Vitals  /80 (BP Location: Left arm, Patient Position: Lying)   Pulse 66   Temp 36.1 °C (97 °F) (Temporal)   Resp 18          Intake/Output Summary (Last 24 hours) at 4/22/2024 0826  Last data filed at 4/22/2024 0143  Gross per 24 hour   Intake 1139 ml   Output 250 ml   Net 889 ml                Patient is alert and oriented x3.  HEENT is unremarkable mucous members are moist  Neck no JVP no bruits upstrokes are full no thyromegaly  Lungs diminished breath sounds bilaterally at the bases  Heart regular rhythm normal S1-S2 there is no S3 no murmurs are heard.  Abdomen is soft vessels are positive nontender nondistended no organomegaly no pulsatile masses  Extremities have no edema.  Distal pulses present palpable.  Neuro is grossly nonfocal  Skin has no rashes     Image Results  MR foot right wo IV contrast  Narrative: Interpreted By:  Brent Giraldo,   STUDY:  MRI of the  right foot without IV contrast;  4/20/2024 2:58 pm      INDICATION:  Signs/Symptoms:pressure  ulcerations right foot. Xr showed focal gas.      COMPARISON:  None.      ACCESSION NUMBER(S):  [id number]      ORDERING CLINICIAN:  EDILSON ALEGRE      TECHNIQUE:  MR imaging of the  right foot was obtained  without administration of  intravenous contrast medium. The patient is limited by motion.  Effusion consist were not able to be completed as the patient was  unable to tolerate the exam.      FINDINGS:  Skin and soft tissue defect present in the posterior aspect of the  calcaneus. There is associated cellulitis. No definite marrow edema  or loss of the T1 signal seen on these views.      Markedly limited evaluation of the forefoot due to motion. There is  diffuse soft tissue edema in the forefoot especially dorsally. No  discrete soft tissue collection seen.      No evidence of abnormal marrow edema or osteomyelitis by MRI in the  4th toe.      Moderate muscle in the plantar musculature suggestive of ischemic  myopathy.      Evaluation of tendons or ligaments is markedly limited due to the  limitations of the study. No severe tenosynovitis seen. There is no  fracture in the visualized osseous structures      Impression: Study is limited and incomplete as the patient was unable to tolerate  the exam.      1. Within this limitation no evidence of osteomyelitis in the  posterior calcaneus or the 4th digit. Ulcers are present at these 2  locations with associated cellulitis and phlegmonous changes. If  there is persistent clinical concern however repeat MR imaging with  sedation can be performed  2. 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 This study was interpreted at [facility], [place], [place].      MACRO:  None      Signed by: Brent Giraldo 4/21/2024 9:49 AM  Dictation workstation:   [id number]        Relevant Results:  RESULTS:    Lab Results   Component Value Date    WBC 8.8  04/22/2024    HGB 11.8 (L) 04/22/2024    HCT 39.4 04/22/2024    MCV 97 04/22/2024     (L) 04/22/2024        Lab Results   Component Value Date    CREATININE 1.47 (H) 04/22/2024    BUN 37 (H) 04/22/2024     04/22/2024    K 4.0 04/22/2024     (H) 04/22/2024    CO2 18 (L) 04/22/2024        Results from last 7 days   Lab Units 04/22/24  0627 04/20/24  0641 04/19/24  2217   PROTEIN TOTAL g/dL 5.0*  --   --    BILIRUBIN TOTAL mg/dL 0.8  --   --    ALK PHOS U/L 86  --   --    ALT U/L 10  --   --    AST U/L 21  --   --    GLUCOSE mg/dL 101*   < >  --    BNP pg/mL  --   --  1,113*    < > = values in this interval not displayed.       Assessment/Plan      4/21/2024     1.  Pleural effusions elevated BNP.  Possible congestive heart failure.  No other clinical symptomatology.  The patient was admitted with cellulitis of her foot.  Await echocardiography.  She is not in any respiratory distress.     2.  Check an EKG    4/22/2024    1.  Pleural effusions with an elevated BNP.  Echo pending.  Dose of Lasix today.  On oxygen.  Primary complaint is of pain in her foot.  I discussed the case with the patient's granddaughter who provided translation.  They would like to proceed with palliative care.  I did discuss this with the son yesterday briefly.  I will order Lasix.  Await echo.  Order palliative care consult.    Viridiana Burgos MD

## 2024-04-22 NOTE — PROGRESS NOTES
Subjective:  Appears fatigued.    Physical Exam:  General: Chronically ill-appearing, frail  HEENT: no obvious lesions  Neck: supple without obviously elevated JVP  Cardiovascular: regular rate, regular rhythm, no peripheral edema  Respiratory: Bibasilar crackles, shallow breathing  Abdominal: no organomegaly or tenderness to palpation, no peritoneal signs  MSK: grossly normal ROM without deformities  Skin: Right lower extremity is wrapped however from what I am able to see she has necrotic ulcerated lesions on her right calf  Neurologic: Difficult to assess  Psychatric: Calm    Visit Vitals  /83   Pulse 80   Temp 35.8 °C (96.4 °F)   Resp 18        All other imaging, labs, and recent documents were reviewed and discussed.    Assessment & Plan:  89-year-old presenting for evaluation of right lower extremity wounds.  Pulmonary consulted for pleural effusion.    Daily progress:  4/22: On a few liters oxygen, breathing comfortably, continue diuresis, no intention to perform thoracentesis at this stage    Impression and recommendations:    1.  Pleural effusions, bilateral  Likely chronic from heart failure.  Risks of thoracentesis far outweigh the benefit.  Recommend optimizing heart failure regimen.    2.  Acute hypoxic respiratory failure in the setting of above  On a few liters oxygen.    3.  Right lower extremity necrotic wounds  Being followed by podiatry.   Suspect arterial insufficiency ulcers with dry gangrene.  No osteomyelitis.  Antibiotics per primary team.     4.  Frailty  BMI less than 15, albumin less than 2.  Apparently has not been eating for several years.  Prognosis extremely guarded.  Palliative/hospice appropriate.    Please await attending attestation for finalized plan.    Raymundo Tomas, DO    This note was entered with assistance of voice recognition software, minor typographic errors may be present.

## 2024-04-22 NOTE — CONSULTS
Assessment and Plan  Patient is 89 y.o. female  with the following medical Problems:  Acute hypoxic respiratory failure requiring supplemental oxygen.  Bilateral pleural effusions  Dementia  Hypertension    Plan of Care:  Patient's pleural effusions are chronic and likely related to heart failure.  The likely to recur after thoracentesis.  Due to patient's advanced age and dementia, I would recommend against thoracentesis and continue to monitor the patient and manage her symptoms conservatively.  Supplemental oxygen to keep sat 88 to 94%  DVT prophylaxis  Incentive spirometer  PT, OT, and out of bed as tolerated  Speech therapy evaluation    History of Present Illness:   Joann Blank is a 89 y.o. female presenting to emergency department for evaluation of a wound on the base of the fifth metatarsal.  The patient is Armenian speaking and presents with her son and granddaughter.  They state that they have been applying some topical gauze and ointment.  Patient also has a small lesion on her right heel and another lesion on her left foot near the first metatarsal.  Family became concerned so they brought her in for evaluation.  They state that the patient has seen her PCP approximately 1 month ago and did not have these issues.     In ED, CRP 8.74, chloride 109, bicarb 19, BUN 39, creatinine 1.86, GFR 25, calcium 8.3, lactate 3.4 with a repeat of 2.5.  WBCs 15.0.  X-ray completed showing no osteomyelitis per radiology review.  Patient medicated with Tylenol.  Started on IV Zosyn and vancomycin.  Patient given 500 mL normal saline bolus.  Blood pressure 144/80, heart rate 69, respirations 16, temperature 36.0 °C, SpO2 94% on room air.  Urinalysis pending, BMP pending.     Past Medical History  Dementia, glaucoma, hypertension, osteoporosis    Past Medical/Surgical History:   Past Medical History:   Diagnosis Date    Personal history of other diseases of the circulatory system     History of hypertension     No past  surgical history on file.    Family History:   No relevant family history has been documented for this patient.    Allergies:     No Known Allergies      Social history:   reports that she has never smoked. She has never been exposed to tobacco smoke. She has never used smokeless tobacco.    Current Medications:   No recently discontinued medications to reconcile    Review of Systems:   Unable to obtain due to language barrier    Physical Exam:   Vital Signs:   Vitals:    04/21/24 1354   BP: 170/74   Pulse: 73   Resp: 18   Temp: 36.4 °C (97.5 °F)   SpO2: 97%       Input/Output:    Intake/Output Summary (Last 24 hours) at 4/21/2024 2018  Last data filed at 4/21/2024 1836  Gross per 24 hour   Intake 670 ml   Output 250 ml   Net 420 ml       Oxygen requirements:    Ventilator Information:             General appearance: Not in pain or distress, in no respiratory distress    HEENT: Atraumatic/normocephalic, EOMI, GERMAN, pharynx clear, moist mucosa, redness of the uvula appreciated,   Neck: Supple, no jugular venous distension, lymphadenopathy, thyromegaly or carotid bruits  Chest: Decreased breath sounds, no wheezing, +vecrackles and no tenderness over ribs   Cardiovascular: Normal S1, S2, regular rate and rhythm, no murmur, rub or gallop  Abdomen: Normal sounds present, soft, lax with no tenderness, no hepatosplenomegaly, and no masses  Extremities: No edema. Pulses are equally present.   Skin: intact, no rashes   Neurologic: Awake and follows command, no focal deficit     Investigations:  Labs, radiological imaging and cardiac work up were personally reviewed          .       STAFF PHYSICIAN NOTE OF PERSONAL INVOLVEMENT IN CARE  As the attending physician, I certify that I personally reviewed the patient's history and personally examined the patient to confirm the physical findings described above, and that I reviewed the relevant imaging studies and available reports.  I also discussed the differential diagnosis and  all of the proposed management plans with the patient and individuals accompanying the patient to this visit.  They had the opportunity to ask questions about the proposed management plans and to have those questions answered.

## 2024-04-22 NOTE — PROGRESS NOTES
SW met with the patient's grandson and son in reference to palliative care.  SW spoke about the differences between palliative and hospice care.  Resources were provided.  Referrals were placed to [facility], [facility], and [facility] in [facility].  SW will continue to follow up and assist as needed.    BETI STALLINGS LCSW

## 2024-04-22 NOTE — PROGRESS NOTES
Joann Blank is a 89 y.o. female on day 0 of admission presenting with Cellulitis of foot.    Subjective   Pt seen with family, pt has dementia and speaks no english       Physical Exam    Objective     REVIEW OF SYSTEMS  GENERAL:  Negative for malaise, significant weight loss, fever  HEENT:  No changes in hearing or vision, no nose bleeds or other nasal problems and Negative for frequent or significant headaches  NECK:  Negative for lumps, goiter, pain and significant neck swelling  RESPIRATORY:  Negative for cough, wheezing and shortness of breath  CARDIOVASCULAR:  Negative for chest pain, leg swelling and palpitations  GI:  Negative for abdominal discomfort, blood in stools or black stools and change in bowel habits  :  Negative for dysuria, frequency and incontinence  MUSCULOSKELETAL:  Negative for joint pain or swelling, back pain, and muscle pain.  SKIN:  Negative for lesions, rash, and itching  PSYCH:  Negative for sleep disturbance, mood disorder and recent psychosocial stressors  HEMATOLOGY/LYMPHOLOGY:  Negative for prolonged bleeding, bruising easily, and swollen nodes.  ENDOCRINE:  Negative for cold or heat intolerance, polyuria, polydipsia and goiter  NEURO: Negative, denies any burning, tingling or numbness     Objective:   Vasc: DP and PT pulses are diminished bilateral.  CFT is less than 3 seconds bilateral.  Skin temperature is warm to cool proximal to distal bilateral.      Neuro: Unable to assess due to mental status    Derm: Nails 1-5 bilateral are intact.  Skin is supple with normal texture and turgor noted.  Webspaces are clean, dry and intact bilateral.  There are no hyperkeratoses, ulcerations, verruca or other lesions noted.  Severe ishemic necrosis entire lateral of right foot    Ortho: Muscle strength is 5/5 for all pedal groups tested.  Ankle joint, subtalar joint, 1st MPJ and lesser MPJ ROM is full and without pain or crepitus.  The foot type is rectus bilateral off weight bearing.   "There are no structural deformities noted.      Last Recorded Vitals  Blood pressure 176/80, pulse 66, temperature 36.1 °C (97 °F), temperature source Temporal, resp. rate 18, height 1.575 m (5' 2\"), weight (!) 36.3 kg (80 lb), SpO2 98%.    Intake/Output last 3 Shifts:  I/O last 3 completed shifts:  In: 1139 (31.4 mL/kg) [P.O.:580; I.V.:309 (8.5 mL/kg); IV Piggyback:250]  Out: 250 (6.9 mL/kg) [Urine:250 (0.2 mL/kg/hr)]  Dosing Weight: 36.3 kg     Relevant Results           Assessment/Plan   Principal Problem:    Cellulitis of foot    Ischemic necrosis right foot, pressure ulcer left heel, severe pressure source, pt in rapid state of decline.  Had lengthy discussion with family, recc hospice care, palliative care only, defer any surgical intervention, may consider vascular consult for BKA right, will order palliative dressings and air boots to offload.   Pt is high risk for any debridement, defer any surgical intervention at this time. Family will consider options for consideration.        Binta Florence DPM      "

## 2024-04-22 NOTE — PROGRESS NOTES
Joann Blank is a 89 y.o. female on day 0 of admission presenting with Cellulitis of foot.      Subjective   Seen today her son and grandson were present long conversation with him regarding her areas of infection including the lungs the foot and her urinary tract infection so far all were going to use in her older age is conservative approach with medical management antibiotics and pain control       Objective     Last Recorded Vitals  /80 (BP Location: Left arm, Patient Position: Lying)   Pulse 66   Temp 36.1 °C (97 °F) (Temporal)   Resp 18   Wt (!) 36.3 kg (80 lb)   SpO2 98%   Intake/Output last 3 Shifts:    Intake/Output Summary (Last 24 hours) at 4/22/2024 1255  Last data filed at 4/22/2024 0143  Gross per 24 hour   Intake 1139 ml   Output 250 ml   Net 889 ml       Admission Weight  Weight: (!) 36.3 kg (80 lb) (04/19/24 1813)    Daily Weight  04/19/24 : (!) 36.3 kg (80 lb)    Image Results  MR foot right wo IV contrast  Narrative: Interpreted By:  Brent Giraldo,   STUDY:  MRI of the  right foot without IV contrast;  4/20/2024 2:58 pm      INDICATION:  Signs/Symptoms:pressure ulcerations right foot. Xr showed focal gas.      COMPARISON:  None.      ACCESSION NUMBER(S):  [id number]      ORDERING CLINICIAN:  EDILSON ALEGRE      TECHNIQUE:  MR imaging of the  right foot was obtained  without administration of  intravenous contrast medium. The patient is limited by motion.  Effusion consist were not able to be completed as the patient was  unable to tolerate the exam.      FINDINGS:  Skin and soft tissue defect present in the posterior aspect of the  calcaneus. There is associated cellulitis. No definite marrow edema  or loss of the T1 signal seen on these views.      Markedly limited evaluation of the forefoot due to motion. There is  diffuse soft tissue edema in the forefoot especially dorsally. No  discrete soft tissue collection seen.      No evidence of abnormal marrow edema or osteomyelitis by  MRI in the  4th toe.      Moderate muscle in the plantar musculature suggestive of ischemic  myopathy.      Evaluation of tendons or ligaments is markedly limited due to the  limitations of the study. No severe tenosynovitis seen. There is no  fracture in the visualized osseous structures      Impression: Study is limited and incomplete as the patient was unable to tolerate  the exam.      1. Within this limitation no evidence of osteomyelitis in the  posterior calcaneus or the 4th digit. Ulcers are present at these 2  locations with associated cellulitis and phlegmonous changes. If  there is persistent clinical concern however repeat MR imaging with  sedation can be performed  2. 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 This study was interpreted at [place], [place].      MACRO:  None      Signed by: Brent Giraldo 4/21/2024 9:49 AM  Dictation workstation:   [id number]    Results from last 7 days   Lab Units 04/22/24  0627   WBC AUTO x10*3/uL 8.8   HEMOGLOBIN g/dL 11.8*   HEMATOCRIT % 39.4   PLATELETS AUTO x10*3/uL 133*      Results from last 7 days   Lab Units 04/22/24  0627   SODIUM mmol/L 137   POTASSIUM mmol/L 4.0   CHLORIDE mmol/L 111*   CO2 mmol/L 18*   BUN mg/dL 37*   CREATININE mg/dL 1.47*   GLUCOSE mg/dL 101*   CALCIUM mg/dL 7.7*        Physical Exam    Relevant Results               Assessment/Plan                  Principal Problem:    Cellulitis of foot    Urinary tract infection    Pulmonary edema and pneumonia    Right foot cellulitis    For now I am going to continue with piperacillin/tazobactam I did not resume the vancomycin because of her renal disease I do not think it is ultimately necessary at this point I am not treating an MRSA I believe she is well covered with just the Zosyn alone.  I am going to monitor her renal function very closely replace her  electrolytes as a need to and continue with diuretics for a conservative approach to the pleural effusions that she has in the pulmonary vascular congestion that we will clear out as well    I spoke with her family at length went through all of her infections the prognosis I reviewed all the procedures imaging studies that we have completed chest x-ray the MRI and I am going to review the echocardiogram I reviewed all of her laboratory as well and continue current conservative approach my estimate because of all the infections and complications and her advanced age 3-5 more days to complete for her to go home family was made aware again today and I did speak with them yesterday as well              Anshul Morris, DO

## 2024-04-23 LAB
ALBUMIN SERPL BCP-MCNC: 2.2 G/DL (ref 3.4–5)
ALP SERPL-CCNC: 99 U/L (ref 33–136)
ALT SERPL W P-5'-P-CCNC: 12 U/L (ref 7–45)
ANION GAP SERPL CALC-SCNC: 13 MMOL/L (ref 10–20)
AORTIC VALVE PEAK VELOCITY: 1.14 M/S
AST SERPL W P-5'-P-CCNC: 20 U/L (ref 9–39)
AV PEAK GRADIENT: 5.2 MMHG
AVA (PEAK VEL): 1.37 CM2
BACTERIA UR CULT: ABNORMAL
BILIRUB SERPL-MCNC: 0.9 MG/DL (ref 0–1.2)
BUN SERPL-MCNC: 33 MG/DL (ref 6–23)
CALCIUM SERPL-MCNC: 7.9 MG/DL (ref 8.6–10.3)
CHLORIDE SERPL-SCNC: 105 MMOL/L (ref 98–107)
CO2 SERPL-SCNC: 20 MMOL/L (ref 21–32)
CREAT SERPL-MCNC: 1.57 MG/DL (ref 0.5–1.05)
EGFRCR SERPLBLD CKD-EPI 2021: 30 ML/MIN/1.73M*2
EJECTION FRACTION APICAL 4 CHAMBER: 60.1
GLUCOSE BLD MANUAL STRIP-MCNC: 100 MG/DL (ref 74–99)
GLUCOSE BLD MANUAL STRIP-MCNC: 112 MG/DL (ref 74–99)
GLUCOSE BLD MANUAL STRIP-MCNC: 97 MG/DL (ref 74–99)
GLUCOSE SERPL-MCNC: 75 MG/DL (ref 74–99)
HOLD SPECIMEN: NORMAL
LEFT ATRIUM VOLUME AREA LENGTH INDEX BSA: 21.6 ML/M2
LEFT VENTRICLE INTERNAL DIMENSION DIASTOLE: 2.77 CM (ref 3.5–6)
LEFT VENTRICULAR OUTFLOW TRACT DIAMETER: 1.8 CM
LV EJECTION FRACTION BIPLANE: 62 %
MITRAL VALVE E/A RATIO: 0.39
POTASSIUM SERPL-SCNC: 3.9 MMOL/L (ref 3.5–5.3)
PROT SERPL-MCNC: 5.7 G/DL (ref 6.4–8.2)
RIGHT VENTRICLE FREE WALL PEAK S': 10.2 CM/S
RIGHT VENTRICLE PEAK SYSTOLIC PRESSURE: 55.7 MMHG
SODIUM SERPL-SCNC: 134 MMOL/L (ref 136–145)
TRICUSPID ANNULAR PLANE SYSTOLIC EXCURSION: 1.9 CM
VANCOMYCIN SERPL-MCNC: 19.5 UG/ML (ref 5–20)

## 2024-04-23 PROCEDURE — 99232 SBSQ HOSP IP/OBS MODERATE 35: CPT | Performed by: INTERNAL MEDICINE

## 2024-04-23 PROCEDURE — 36415 COLL VENOUS BLD VENIPUNCTURE: CPT | Performed by: INTERNAL MEDICINE

## 2024-04-23 PROCEDURE — 2500000004 HC RX 250 GENERAL PHARMACY W/ HCPCS (ALT 636 FOR OP/ED): Performed by: NURSE PRACTITIONER

## 2024-04-23 PROCEDURE — 2500000001 HC RX 250 WO HCPCS SELF ADMINISTERED DRUGS (ALT 637 FOR MEDICARE OP): Performed by: NURSE PRACTITIONER

## 2024-04-23 PROCEDURE — 82947 ASSAY GLUCOSE BLOOD QUANT: CPT

## 2024-04-23 PROCEDURE — 99233 SBSQ HOSP IP/OBS HIGH 50: CPT | Performed by: INTERNAL MEDICINE

## 2024-04-23 PROCEDURE — 2500000001 HC RX 250 WO HCPCS SELF ADMINISTERED DRUGS (ALT 637 FOR MEDICARE OP): Performed by: INTERNAL MEDICINE

## 2024-04-23 PROCEDURE — 1100000001 HC PRIVATE ROOM DAILY

## 2024-04-23 PROCEDURE — 99232 SBSQ HOSP IP/OBS MODERATE 35: CPT

## 2024-04-23 PROCEDURE — 80202 ASSAY OF VANCOMYCIN: CPT | Performed by: NURSE PRACTITIONER

## 2024-04-23 PROCEDURE — 2500000004 HC RX 250 GENERAL PHARMACY W/ HCPCS (ALT 636 FOR OP/ED): Performed by: INTERNAL MEDICINE

## 2024-04-23 PROCEDURE — 36415 COLL VENOUS BLD VENIPUNCTURE: CPT | Performed by: NURSE PRACTITIONER

## 2024-04-23 PROCEDURE — 80053 COMPREHEN METABOLIC PANEL: CPT | Performed by: INTERNAL MEDICINE

## 2024-04-23 RX ORDER — FUROSEMIDE 10 MG/ML
20 INJECTION INTRAMUSCULAR; INTRAVENOUS ONCE
Status: COMPLETED | OUTPATIENT
Start: 2024-04-23 | End: 2024-04-23

## 2024-04-23 RX ORDER — AMLODIPINE BESYLATE 5 MG/1
5 TABLET ORAL DAILY
Status: DISCONTINUED | OUTPATIENT
Start: 2024-04-23 | End: 2024-04-24 | Stop reason: HOSPADM

## 2024-04-23 RX ADMIN — Medication 500 MG: at 09:46

## 2024-04-23 RX ADMIN — ACETAMINOPHEN 650 MG: 325 TABLET ORAL at 09:56

## 2024-04-23 RX ADMIN — DONEPEZIL HYDROCHLORIDE 5 MG: 5 TABLET, FILM COATED ORAL at 09:45

## 2024-04-23 RX ADMIN — HEPARIN SODIUM 5000 UNITS: 5000 INJECTION INTRAVENOUS; SUBCUTANEOUS at 21:36

## 2024-04-23 RX ADMIN — CASTOR OIL AND BALSAM, PERU: 788; 87 OINTMENT TOPICAL at 09:56

## 2024-04-23 RX ADMIN — AMLODIPINE BESYLATE 5 MG: 5 TABLET ORAL at 09:45

## 2024-04-23 RX ADMIN — FUROSEMIDE 20 MG: 10 INJECTION, SOLUTION INTRAMUSCULAR; INTRAVENOUS at 09:45

## 2024-04-23 RX ADMIN — TRAZODONE HYDROCHLORIDE 50 MG: 50 TABLET ORAL at 21:36

## 2024-04-23 RX ADMIN — ACETAMINOPHEN 650 MG: 325 TABLET ORAL at 14:47

## 2024-04-23 RX ADMIN — HEPARIN SODIUM 5000 UNITS: 5000 INJECTION INTRAVENOUS; SUBCUTANEOUS at 05:30

## 2024-04-23 RX ADMIN — PIPERACILLIN SODIUM AND TAZOBACTAM SODIUM 2.25 G: 2; .25 INJECTION, SOLUTION INTRAVENOUS at 16:46

## 2024-04-23 RX ADMIN — TRAMADOL HYDROCHLORIDE 50 MG: 50 TABLET, COATED ORAL at 23:42

## 2024-04-23 RX ADMIN — PIPERACILLIN SODIUM AND TAZOBACTAM SODIUM 2.25 G: 2; .25 INJECTION, SOLUTION INTRAVENOUS at 10:42

## 2024-04-23 RX ADMIN — PIPERACILLIN SODIUM AND TAZOBACTAM SODIUM 2.25 G: 2; .25 INJECTION, SOLUTION INTRAVENOUS at 21:36

## 2024-04-23 RX ADMIN — HEPARIN SODIUM 5000 UNITS: 5000 INJECTION INTRAVENOUS; SUBCUTANEOUS at 14:47

## 2024-04-23 RX ADMIN — HYDRALAZINE HYDROCHLORIDE 5 MG: 20 INJECTION INTRAMUSCULAR; INTRAVENOUS at 05:30

## 2024-04-23 RX ADMIN — CASTOR OIL AND BALSAM, PERU: 788; 87 OINTMENT TOPICAL at 21:36

## 2024-04-23 RX ADMIN — PIPERACILLIN SODIUM AND TAZOBACTAM SODIUM 2.25 G: 2; .25 INJECTION, SOLUTION INTRAVENOUS at 03:56

## 2024-04-23 ASSESSMENT — COGNITIVE AND FUNCTIONAL STATUS - GENERAL
WALKING IN HOSPITAL ROOM: TOTAL
EATING MEALS: TOTAL
DRESSING REGULAR UPPER BODY CLOTHING: TOTAL
WALKING IN HOSPITAL ROOM: TOTAL
DRESSING REGULAR LOWER BODY CLOTHING: TOTAL
PERSONAL GROOMING: TOTAL
MOVING FROM LYING ON BACK TO SITTING ON SIDE OF FLAT BED WITH BEDRAILS: TOTAL
MOBILITY SCORE: 6
STANDING UP FROM CHAIR USING ARMS: TOTAL
PERSONAL GROOMING: TOTAL
DRESSING REGULAR UPPER BODY CLOTHING: TOTAL
TOILETING: TOTAL
TOILETING: TOTAL
MOVING FROM LYING ON BACK TO SITTING ON SIDE OF FLAT BED WITH BEDRAILS: TOTAL
MOVING TO AND FROM BED TO CHAIR: TOTAL
STANDING UP FROM CHAIR USING ARMS: TOTAL
DAILY ACTIVITIY SCORE: 6
HELP NEEDED FOR BATHING: TOTAL
MOBILITY SCORE: 6
EATING MEALS: TOTAL
TURNING FROM BACK TO SIDE WHILE IN FLAT BAD: TOTAL
DRESSING REGULAR LOWER BODY CLOTHING: TOTAL
TURNING FROM BACK TO SIDE WHILE IN FLAT BAD: TOTAL
DAILY ACTIVITIY SCORE: 6
CLIMB 3 TO 5 STEPS WITH RAILING: TOTAL
CLIMB 3 TO 5 STEPS WITH RAILING: TOTAL
MOVING TO AND FROM BED TO CHAIR: TOTAL
HELP NEEDED FOR BATHING: TOTAL

## 2024-04-23 ASSESSMENT — PAIN - FUNCTIONAL ASSESSMENT: PAIN_FUNCTIONAL_ASSESSMENT: 0-10

## 2024-04-23 ASSESSMENT — PAIN SCALES - GENERAL
PAINLEVEL_OUTOF10: 0 - NO PAIN
PAINLEVEL_OUTOF10: 0 - NO PAIN

## 2024-04-23 NOTE — PROGRESS NOTES
Cardiology Progress Note      Joann Blank is a 89 y.o. female on day 1 of admission presenting with Cellulitis of foot.      Subjective   Patient seen and examined.  Discussed with family at the bedside.  Uneventful night       ROS:  10 systems reviewed other than what is mentioned above.     MEDICATION:    Scheduled medications  amLODIPine, 5 mg, oral, Daily  balsam peru-castor oiL, , Topical, BID  cranberry, 500 mg, oral, Daily  donepezil, 5 mg, oral, Daily  furosemide, 20 mg, intravenous, Once  heparin (porcine), 5,000 Units, subcutaneous, q8h ESVIN  [Held by provider] losartan, 50 mg, oral, Daily  piperacillin-tazobactam, 2.25 g, intravenous, q6h  traZODone, 50 mg, oral, Nightly      Continuous medications     PRN medications  PRN medications: acetaminophen, dextrose, dextrose, glucagon, glucagon, hydrALAZINE, ipratropium-albuteroL, polyethylene glycol, traMADol, vancomycin     Principal Problem:    Cellulitis of foot      OBJECTIVE:    Visit Vitals  BP (!) 194/91 (BP Location: Right arm, Patient Position: Lying)   Pulse 77   Temp 36.3 °C (97.3 °F) (Temporal)   Resp 18          Intake/Output Summary (Last 24 hours) at 4/23/2024 0823  Last data filed at 4/23/2024 0538  Gross per 24 hour   Intake 760 ml   Output 1700 ml   Net -940 ml                Patient is alert and oriented x3.  HEENT is unremarkable mucous members are moist  Neck no JVP no bruits upstrokes are full no thyromegaly  Lungs diminished breath sounds bilaterally at the bases  Heart regular rhythm normal S1-S2 there is no S3 no murmurs are heard.  Abdomen is soft vessels are positive nontender nondistended no organomegaly no pulsatile masses  Extremities have no edema.  Distal pulses present palpable.  Neuro is grossly nonfocal  Skin has no rashes     Image Results  Transthoracic Echo (TTE) Complete      [facility], [address] [facility], [facility] [address] [phone] and                                  Fax [phone]    TRANSTHORACIC  ECHOCARDIOGRAM REPORT       Patient Name:      SUZI COLLINS         Reading Physician:    [id number] Viridiana Burgos MD  Study Date:        4/22/2024            Ordering Provider:    [id number] EMRE HOWE  MRN/PID:           [id number]             Fellow:  Accession#:        [id number]         Nurse:  Date of Birth/Age: 11/25/1929 / 89      Sonographer:          Lynn sr                                      LUCERO  Gender:            F                    Additional Staff:  Height:            157.48 cm            Admit Date:           4/20/2024  Weight:            36.29 kg             Admission Status:     Inpatient -                                                                Routine  BSA / BMI:         1.29 m2 / 14.63      Encounter#:           [id number]                     kg/m2                                          Department Location:  [facility]  Blood Pressure: 176 /80 mmHg    Study Type:    TRANSTHORACIC ECHO (TTE) COMPLETE  Diagnosis/ICD: Pleural Effusion-J90; Acute pulmonary edema-J81.0  Indication:    Elevated BNP  CPT Code:      Echo Complete w Full Doppler-[id number]    Patient History:  Pertinent History: HTN.    Study Detail: The following Echo studies were performed: 2D, M-Mode, Doppler and                color flow. Technically challenging study due to patient lying in                supine position and body habitus.       PHYSICIAN INTERPRETATION:  Left Ventricle: The left ventricular systolic function is normal, with an estimated ejection fraction of 60-65%. There are no regional wall motion abnormalities. The left ventricular cavity size is decreased. There is moderate concentric left ventricular hypertrophy. Spectral Doppler shows an impaired relaxation pattern of left ventricular diastolic filling.  Left Atrium: The left atrium is normal in size.  Right Ventricle: The right ventricle is normal in  size. There is normal right ventricular global systolic function.  Right Atrium: The right atrium is normal in size.  Aortic Valve: The aortic valve is trileaflet. There is mild to moderate aortic valve thickening. There is evidence of mildly elevated transaortic gradients consistent with sclerosis of the aortic valve.  There is no evidence of aortic valve regurgitation. The peak instantaneous gradient of the aortic valve is 5.2 mmHg.  Mitral Valve: The mitral valve is normal in structure. There is no evidence of mitral valve regurgitation.  Tricuspid Valve: The tricuspid valve is structurally normal. There is moderate to severe tricuspid regurgitation. The Doppler estimated RVSP is moderate to severely elevated at 55.7 mmHg.  Pulmonic Valve: The pulmonic valve is structurally normal. There is physiologic pulmonic valve regurgitation.  Pericardium: There is no pericardial effusion noted.  Aorta: The aortic root is normal.       CONCLUSIONS:   1. Left ventricular systolic function is normal with a 60-65% estimated ejection fraction.   2. Spectral Doppler shows an impaired relaxation pattern of left ventricular diastolic filling.   3. There is moderate concentric left ventricular hypertrophy.   4. Moderate to severe tricuspid regurgitation visualized.   5. Moderate to severely elevated right ventricular systolic pressure.   6. Aortic valve sclerosis.   7. Left ventricular cavity size is decreased.    QUANTITATIVE DATA SUMMARY:  2D MEASUREMENTS:                           Normal Ranges:  Ao Root d:     2.80 cm   (2.0-3.7cm)  LAs:           2.80 cm   (2.7-4.0cm)  IVSd:          1.48 cm   (0.6-1.1cm)  LVPWd:         0.99 cm   (0.6-1.1cm)  LVIDd:         2.77 cm   (3.9-5.9cm)  LVIDs:         1.90 cm  LV Mass Index: 79.3 g/m2  LV % FS        31.4 %    LA VOLUME:                                Normal Ranges:  LA Vol A4C:        32.6 ml    (22+/-6mL/m2)  LA Vol A2C:        23.5 ml  LA Vol BP:         27.9 ml  LA Vol Index  A4C:  25.2ml/m2  LA Vol Index A2C:  18.1 ml/m2  LA Vol Index BP:   21.6 ml/m2  LA Area A4C:       14.4 cm2  LA Area A2C:       12.1 cm2  LA Major Axis A4C: 5.4 cm  LA Major Axis A2C: 5.3 cm  LA Volume Index:   20.5 ml/m2  LA Vol A4C:        30.8 ml  LA Vol A2C:        22.4 ml    RA VOLUME BY A/L METHOD:                        Normal Ranges:  RA Area A4C: 16.4 cm2    M-MODE MEASUREMENTS:                   Normal Ranges:  Ao Root: 3.00 cm (2.0-3.7cm)  LAs:     3.40 cm (2.7-4.0cm)    AORTA MEASUREMENTS:                     Normal Ranges:  Asc Ao, d: 3.10 cm (2.1-3.4cm)    LV SYSTOLIC FUNCTION BY 2D PLANIMETRY (MOD):                      Normal Ranges:  EF-A4C View: 60.1 % (>=55%)  EF-A2C View: 67.6 %  EF-Biplane:  61.7 %    LV DIASTOLIC FUNCTION:                            Normal Ranges:  MV Peak E:    0.39 m/s    (0.7-1.2 m/s)  MV Peak A:    1.02 m/s    (0.42-0.7 m/s)  E/A Ratio:    0.39        (1.0-2.2)  MV lateral e' 0.08 m/s  MV medial e'  0.05 m/s  MV A Dur:     143.00 msec    AORTIC VALVE:                          Normal Ranges:  AoV Vmax:      1.14 m/s (<=1.7m/s)  AoV Peak P.2 mmHg (<20mmHg)  LVOT Max Jama:  0.62 m/s (<=1.1m/s)  LVOT VTI:      7.68 cm  LVOT Diameter: 1.80 cm  (1.8-2.4cm)  AoV Area,Vmax: 1.37 cm2 (2.5-4.5cm2)       RIGHT VENTRICLE:  RV Basal 2.92 cm  RV Mid   1.70 cm  RV Major 5.5 cm  TAPSE:   19.3 mm  RV s'    0.10 m/s    TRICUSPID VALVE/RVSP:                              Normal Ranges:  Peak TR Velocity: 3.63 m/s  RV Syst Pressure: 55.7 mmHg (< 30mmHg)       [id number] Viridiana Burgos MD  Electronically signed on 2024 at 8:20:05 AM       ** Final **        Relevant Results:  RESULTS:    Lab Results   Component Value Date    WBC 8.8 2024    HGB 11.8 (L) 2024    HCT 39.4 2024    MCV 97 2024     (L) 2024        Lab Results   Component Value Date    CREATININE 1.57 (H) 2024    BUN 33 (H) 2024     (L) 2024    K 3.9 2024      04/23/2024    CO2 20 (L) 04/23/2024        Results from last 7 days   Lab Units 04/23/24  0630 04/20/24  0641 04/19/24  2217   PROTEIN TOTAL g/dL 5.7*   < >  --    BILIRUBIN TOTAL mg/dL 0.9   < >  --    ALK PHOS U/L 99   < >  --    ALT U/L 12   < >  --    AST U/L 20   < >  --    GLUCOSE mg/dL 75   < >  --    BNP pg/mL  --   --  1,113*    < > = values in this interval not displayed.       Assessment/Plan      4/21/2024     1.  Pleural effusions elevated BNP.  Possible congestive heart failure.  No other clinical symptomatology.  The patient was admitted with cellulitis of her foot.  Await echocardiography.  She is not in any respiratory distress.     2.  Check an EKG    4/22/2024    1.  Pleural effusions with an elevated BNP.  Echo pending.  Dose of Lasix today.  On oxygen.  Primary complaint is of pain in her foot.  I discussed the case with the patient's granddaughter who provided translation.  They would like to proceed with palliative care.  I did discuss this with the son yesterday briefly.  I will order Lasix.  Await echo.  Order palliative care consult.    4/23/2024    1.  Elevated BNP pleural effusions.  Echo reviewed.  Small left-ventricular cavity moderate LVH diastolic dysfunction.  EF 65%.  Moderate to severe pulmonary hypertension was noted.  Dose of Lasix.  Management of HFrEF would include addition of an SGLT2 inhibitor.  With her age, and conservative status and family wanting palliative care, I will withhold that.  Dose of Lasix.    2.  Hypertension suboptimal control add a renal neutral drugs such as amlodipine for blood pressure control.    3.  Wounds.  This is on the right lower extremity.  Being followed by podiatry.    Await recommendations from palliative care.    Viridiana Burgos MD

## 2024-04-23 NOTE — PROGRESS NOTES
Subjective   Oliguria improving  Blood chemistry stable BUN 33 creatinine 1.57  X-ray disclosed persistent bibasilar infiltrates consistent with pneumonia currently on antibiotics  Objective          Vitals 24HR  Heart Rate:  [65-83]   Temp:  [35.8 °C (96.4 °F)-36.4 °C (97.5 °F)]   Resp:  [16-18]   BP: (160-194)/(77-91)   SpO2:  [88 %-95 %]         Intake/Output last 3 Shifts:    Intake/Output Summary (Last 24 hours) at 4/23/2024 1232  Last data filed at 4/23/2024 0958  Gross per 24 hour   Intake 1000 ml   Output 1700 ml   Net -700 ml     Results for orders placed or performed during the hospital encounter of 04/19/24 (from the past 24 hour(s))   Transthoracic Echo (TTE) Complete   Result Value Ref Range    AV pk michael 1.14 m/s    LVOT diam 1.80 cm    LV Biplane EF 62 %    MV E/A ratio 0.39     LA vol index A/L 21.6 ml/m2    Tricuspid annular plane systolic excursion 1.9 cm    RV free wall pk S' 10.20 cm/s    LVIDd 2.77 cm    RVSP 55.7 mmHg    Aortic Valve Area by Continuity of Peak Velocity 1.37 cm2    AV pk grad 5.2 mmHg    LV A4C EF 60.1    POCT GLUCOSE   Result Value Ref Range    POCT Glucose 87 74 - 99 mg/dL   Vancomycin, Trough   Result Value Ref Range    Vancomycin, Trough 12.1 5.0 - 20.0 ug/mL   Comprehensive Metabolic Panel   Result Value Ref Range    Glucose 75 74 - 99 mg/dL    Sodium 134 (L) 136 - 145 mmol/L    Potassium 3.9 3.5 - 5.3 mmol/L    Chloride 105 98 - 107 mmol/L    Bicarbonate 20 (L) 21 - 32 mmol/L    Anion Gap 13 10 - 20 mmol/L    Urea Nitrogen 33 (H) 6 - 23 mg/dL    Creatinine 1.57 (H) 0.50 - 1.05 mg/dL    eGFR 30 (L) >60 mL/min/1.73m*2    Calcium 7.9 (L) 8.6 - 10.3 mg/dL    Albumin 2.2 (L) 3.4 - 5.0 g/dL    Alkaline Phosphatase 99 33 - 136 U/L    Total Protein 5.7 (L) 6.4 - 8.2 g/dL    AST 20 9 - 39 U/L    Bilirubin, Total 0.9 0.0 - 1.2 mg/dL    ALT 12 7 - 45 U/L   Lavender Top   Result Value Ref Range    Extra Tube Hold for add-ons.        MR foot right wo IV contrast    Result Date:  4/21/2024  Interpreted By:  Brent Giraldo, STUDY: MRI of the  right foot without IV contrast;  4/20/2024 2:58 pm   INDICATION: Signs/Symptoms:pressure ulcerations right foot. Xr showed focal gas.   COMPARISON: None.   ACCESSION NUMBER(S): [id number]   ORDERING CLINICIAN: EDILSON ALEGRE   TECHNIQUE: MR imaging of the  right foot was obtained  without administration of intravenous contrast medium. The patient is limited by motion. Effusion consist were not able to be completed as the patient was unable to tolerate the exam.   FINDINGS: Skin and soft tissue defect present in the posterior aspect of the calcaneus. There is associated cellulitis. No definite marrow edema or loss of the T1 signal seen on these views.   Markedly limited evaluation of the forefoot due to motion. There is diffuse soft tissue edema in the forefoot especially dorsally. No discrete soft tissue collection seen.   No evidence of abnormal marrow edema or osteomyelitis by MRI in the 4th toe.   Moderate muscle in the plantar musculature suggestive of ischemic myopathy.   Evaluation of tendons or ligaments is markedly limited due to the limitations of the study. No severe tenosynovitis seen. There is no fracture in the visualized osseous structures       Study is limited and incomplete as the patient was unable to tolerate the exam.   1. Within this limitation no evidence of osteomyelitis in the posterior calcaneus or the 4th digit. Ulcers are present at these 2 locations with associated cellulitis and phlegmonous changes. If there is persistent clinical concern however repeat MR imaging with sedation can be performed 2. 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 This study was interpreted at [facility], [place], [place].   MACRO: None   Signed by: Brent Giraldo 4/21/2024 9:49 AM Dictation workstation:   [id number]     Physical Exam  HEENT; enophthalmos pupils react light accommodation  Neck; no JVD no bruit no thyromegaly  Lungs; positive crackles on inspiration no wheezing  Heart; no gallops or rubs regular rate  Abdomen; active peristalsis no rebound or guarding  Neurological; no tics or tremors no asterixis    Assessment/Plan    Acute kidney injury  Oliguria improving transaminitis  UTI  Pneumonia  Secondary hyperparathyroidism  Plan continue current medications and therapy          Ciro Chávez MD

## 2024-04-23 NOTE — CONSULTS
"Nutrition Initial Assessment:   Nutrition Assessment    Reason for Assessment: Admission nursing screening    Patient is a 89 y.o. female presenting with wound on toe      Nutrition History:  Food and Nutrient History: Pt ate about 2/3's of her lunch.  I mentioned to the patient's son that she is painfully thin.  He agreed but also does not force her to eat and feels she is doing ok for someone who is 89.  Food Allergies/Intolerances:  None  GI Symptoms: None  Oral Problems: Swallowing difficulty       Anthropometrics:  Height: 157.5 cm (5' 2.01\")   Weight: (!) 36.3 kg (79 lb 15.7 oz)   BMI (Calculated): 14.63  IBW/kg (Dietitian Calculated): 50 kg  Percent of IBW: 73 %       Weight History:   Wt Readings from Last 10 Encounters:   04/23/24 (!) 36.3 kg (79 lb 15.7 oz)   11/14/22 41.3 kg (91 lb)   10/24/22 40.8 kg (90 lb)   09/30/22 43.1 kg (95 lb)   09/18/22 43.1 kg (95 lb)   08/25/20 42.6 kg (94 lb)   01/27/20 42.6 kg (94 lb)         Weight Change %: Pt is down 11% of her weight over the past 15 months per records       Nutrition Focused Physical Exam Findings:    Subcutaneous Fat Loss:   Orbital Fat Pads: Severe (dark circles, hollowing and loose skin)  Buccal Fat Pads: Severe (hollow, sunken and narrow face)  Triceps: Severe (negligible fat tissue)  Muscle Wasting:  Temporalis: Severe (hollowed scooping depression)  Pectoralis (Clavicular Region): Severe (protruding prominent clavicle)  Deltoid/Trapezius: Severe (squared shoulders, acromion process prominent)  Interosseous: Severe (depressed area between thumb and forefinger)  Edema:     Physical Findings:  Skin: Positive (wounds on 5th metatarsal, heel and other toe)    Nutrition Significant Labs:  BMP Trend:   Results from last 7 days   Lab Units 04/23/24  0630 04/22/24  0627 04/21/24  0623 04/20/24  0641   GLUCOSE mg/dL 75 101* 114* 102*   CALCIUM mg/dL 7.9* 7.7* 7.7* 7.6*   SODIUM mmol/L 134* 137 140 140   POTASSIUM mmol/L 3.9 4.0 4.2 4.1   CO2 mmol/L 20* 18* " 23 20*   CHLORIDE mmol/L 105 111* 110* 111*   BUN mg/dL 33* 37* 43* 41*   CREATININE mg/dL 1.57* 1.47* 1.68* 1.81*        Nutrition Specific Medications:  Norvasc; lasix; cozaar; miralax    I/O:   Last BM Date: 04/19/24;      Dietary Orders (From admission, onward)       Start     Ordered    04/22/24 1121  Adult diet 2-3 grams sodium; Pureed 4; Moderately thick 3  Diet effective now        Question Answer Comment   Diet type 2-3 grams sodium    Texture Pureed 4    Fluid consistency Moderately thick 3        04/22/24 1121    04/21/24 1729  Oral nutritional supplements  Until discontinued        Question Answer Comment   Deliver with Breakfast    Deliver with Lunch    Deliver with All meals    Select supplement: Magic Cup        04/21/24 1729                     Estimated Needs:      Method for Estimating Needs: 7191-1147   MSJ     Method for Estimating Needs: 50-60  1-1.2 gm kg of IBW     Method for Estimating Needs: 8921-5051  20-30 ml kg of IBW        Nutrition Diagnosis   Malnutrition Diagnosis  Patient has Malnutrition Diagnosis: Yes  Diagnosis Status: New  Malnutrition Diagnosis: Severe malnutrition related to chronic disease or condition  As Evidenced by: Severe muscle and fat loss noted on physical exam.  Pt is 72% of her IBW.  She has dementia    Nutrition Diagnosis  Patient has Nutrition Diagnosis: Yes  Diagnosis Status (1): New  Nutrition Diagnosis 1: Swallowing difficulity  Related to (1): motor causes  As Evidenced by (1): pt is on a dysphagia diet per SLP       Nutrition Interventions/Recommendations         Nutrition Prescription:  Individualized Nutrition Prescription Provided for : Continue diet texture per pt need,  discontinuing low sodium restriction to possibly encourage intake, although the patient has not eaten excessively or even enough to take in a higher sodium content, per her malnourished status, continue magic cup X 3 to help meet nutritional needs,         Nutrition Education:       N/A    Nutrition Monitoring and Evaluation   Food/Nutrient Related History Monitoring  Monitoring and Evaluation Plan: Energy intake, Fluid intake, Amount of food         Biochemical Data, Medical Tests and Procedures  Monitoring and Evaluation Plan: Electrolyte/renal panel, Glucose/endocrine profile            Time Spent/Follow-up Reminder:   Time Spent (min): 45 minutes  Last Date of Nutrition Visit: 04/23/24  Nutrition Follow-Up Needed?: 3-5 days  Follow up Comment: 4/26  MZ

## 2024-04-23 NOTE — CARE PLAN
The patient's goals for the shift include      The clinical goals for the shift include pt will remain comfortable during shift

## 2024-04-23 NOTE — DOCUMENTATION CLARIFICATION NOTE
"    PATIENT:               SUZI COLLINS  ACCT #:                  [id number]  MRN:                       [id number]  :                       1929  ADMIT DATE:       2024 6:15 PM  DISCH DATE:  RESPONDING PROVIDER #:        [id number]          PROVIDER RESPONSE TEXT:    unspecified    CDI QUERY TEXT:    Clarification    Instruction:    Based on your assessment of the patient and the clinical information, please provide the requested documentation by clicking on the appropriate radio button and enter any additional information if prompted.    Question: Please further specify the type of pneumonia being treated    When answering this query, please exercise your independent professional judgment. The fact that a question is being asked, does not imply that any particular answer is desired or expected.    The patient's clinical indicators include:  Clinical Information 89 yo presenting with multiple medical conditions including pneumonia    Clinical Indicators PN 24 Dr. Morris  \"multiple multiple different issues involved right now from this pneumonia to the cellulitis\"    24  Chest Xray  \"Limited exam. Bibasilar infiltrates and pleural effusions, right more than left, possibly due to pulmonary edema or bilateral pneumonia\"    24  Speech Evaluation \"patient was fed tsp sips of puree, whole pill x1 in liquid carrier, and tsp sips of mildly and moderately thick liquids. Patient taking small bites of puree but full tsp sips of thickened liquids. Prompt oral manipulation for all PO trials with timely A-P transit time and swallow onset. Patient often with facial grimace during swallow, which patient's granddaughter reports is normal. She says it seems like it is painful for her to swallow but patient does not report any pain\", \"Frequent dry coughing observed following tsp sips of mildly thick liquids\", \"Patient was given full tsp sips of moderately thick water with no evidence of coughing or " "choking\"    4/22/24 Dr. Morris  \"continue with piperacillin/tazobactam I did not resume the vancomycin because of her renal disease I do not think it is ultimately necessary at this point I am not treating an MRSA I believe she is well covered with just the Zosyn alone\"    Treatment  IV Zosyn, IV Vancomycin, supplemental oxygen, Recommendations for pureed/extremely thick, nebulizer treatments    Risk Factors  89 yo with dysphagia, pneumonia, frailty, BMI 14.63  Options provided:  -- Aspiration PNA  -- Gram Negative PNA  -- Other - I will add my own diagnosis  -- Refer to Clinical Documentation Reviewer    Query created by: Marla Alejandre on 4/23/2024 7:34 AM      Electronically signed by:  NICCI MORRIS DO 4/23/2024 4:57 PM          "

## 2024-04-23 NOTE — PROGRESS NOTES
Suzi Collins is a 89 y.o. female on day 1 of admission presenting with Cellulitis of foot.      Subjective   Seen today in review we are not going to pursue any aggressive measures with her she does have what looks to be pleural effusions pneumonia soft tissue cellulitis right foot no osteomyelitis.  She also has a urinary tract infection growing Klebsiella and it is sensitive to Augmentin.  She has no allergies as recorded       Objective     Last Recorded Vitals  /55   Pulse 90   Temp 35.9 °C (96.6 °F)   Resp 18   Wt (!) 36.3 kg (79 lb 15.7 oz)   SpO2 (!) 88%   Intake/Output last 3 Shifts:    Intake/Output Summary (Last 24 hours) at 4/23/2024 1723  Last data filed at 4/23/2024 0958  Gross per 24 hour   Intake 760 ml   Output 1700 ml   Net -940 ml       Admission Weight  Weight: (!) 36.3 kg (80 lb) (04/19/24 1813)    Daily Weight  04/23/24 : (!) 36.3 kg (79 lb 15.7 oz)    Image Results  ECG 12 lead  Sinus rhythm  Multiform ventricular premature complexes  Transthoracic Echo (TTE) [facility], [address] [place], [place] [address] [phone] and                                  Fax [phone]    TRANSTHORACIC ECHOCARDIOGRAM REPORT       Patient Name:      SUZI COLLINS         Reading Physician:    [id number] Viridiana Burgos MD  Study Date:        4/22/2024            Ordering Provider:    [id number] EMRE HOWE  MRN/PID:           [id number]             Fellow:  Accession#:        [id number]         Nurse:  Date of Birth/Age: 11/25/1929 / 89      Sonographer:          Lynn sr RDCS  Gender:            F                    Additional Staff:  Height:            157.48 cm            Admit Date:           4/20/2024  Weight:            36.29 kg             Admission Status:     Inpatient -                                                                 Routine  BSA / BMI:         1.29 m2 / 14.63      Encounter#:           [id number]                     kg/m2                                          Department Location:  [facility]  Blood Pressure: 176 /80 mmHg    Study Type:    TRANSTHORACIC ECHO (TTE) COMPLETE  Diagnosis/ICD: Pleural Effusion-J90; Acute pulmonary edema-J81.0  Indication:    Elevated BNP  CPT Code:      Echo Complete w Full Doppler-26698    Patient History:  Pertinent History: HTN.    Study Detail: The following Echo studies were performed: 2D, M-Mode, Doppler and                color flow. Technically challenging study due to patient lying in                supine position and body habitus.       PHYSICIAN INTERPRETATION:  Left Ventricle: The left ventricular systolic function is normal, with an estimated ejection fraction of 60-65%. There are no regional wall motion abnormalities. The left ventricular cavity size is decreased. There is moderate concentric left ventricular hypertrophy. Spectral Doppler shows an impaired relaxation pattern of left ventricular diastolic filling.  Left Atrium: The left atrium is normal in size.  Right Ventricle: The right ventricle is normal in size. There is normal right ventricular global systolic function.  Right Atrium: The right atrium is normal in size.  Aortic Valve: The aortic valve is trileaflet. There is mild to moderate aortic valve thickening. There is evidence of mildly elevated transaortic gradients consistent with sclerosis of the aortic valve.  There is no evidence of aortic valve regurgitation. The peak instantaneous gradient of the aortic valve is 5.2 mmHg.  Mitral Valve: The mitral valve is normal in structure. There is no evidence of mitral valve regurgitation.  Tricuspid Valve: The tricuspid valve is structurally normal. There is moderate to severe tricuspid regurgitation. The Doppler estimated RVSP is moderate to severely elevated at 55.7 mmHg.  Pulmonic Valve: The pulmonic valve is  structurally normal. There is physiologic pulmonic valve regurgitation.  Pericardium: There is no pericardial effusion noted.  Aorta: The aortic root is normal.       CONCLUSIONS:   1. Left ventricular systolic function is normal with a 60-65% estimated ejection fraction.   2. Spectral Doppler shows an impaired relaxation pattern of left ventricular diastolic filling.   3. There is moderate concentric left ventricular hypertrophy.   4. Moderate to severe tricuspid regurgitation visualized.   5. Moderate to severely elevated right ventricular systolic pressure.   6. Aortic valve sclerosis.   7. Left ventricular cavity size is decreased.    QUANTITATIVE DATA SUMMARY:  2D MEASUREMENTS:                           Normal Ranges:  Ao Root d:     2.80 cm   (2.0-3.7cm)  LAs:           2.80 cm   (2.7-4.0cm)  IVSd:          1.48 cm   (0.6-1.1cm)  LVPWd:         0.99 cm   (0.6-1.1cm)  LVIDd:         2.77 cm   (3.9-5.9cm)  LVIDs:         1.90 cm  LV Mass Index: 79.3 g/m2  LV % FS        31.4 %    LA VOLUME:                                Normal Ranges:  LA Vol A4C:        32.6 ml    (22+/-6mL/m2)  LA Vol A2C:        23.5 ml  LA Vol BP:         27.9 ml  LA Vol Index A4C:  25.2ml/m2  LA Vol Index A2C:  18.1 ml/m2  LA Vol Index BP:   21.6 ml/m2  LA Area A4C:       14.4 cm2  LA Area A2C:       12.1 cm2  LA Major Axis A4C: 5.4 cm  LA Major Axis A2C: 5.3 cm  LA Volume Index:   20.5 ml/m2  LA Vol A4C:        30.8 ml  LA Vol A2C:        22.4 ml    RA VOLUME BY A/L METHOD:                        Normal Ranges:  RA Area A4C: 16.4 cm2    M-MODE MEASUREMENTS:                   Normal Ranges:  Ao Root: 3.00 cm (2.0-3.7cm)  LAs:     3.40 cm (2.7-4.0cm)    AORTA MEASUREMENTS:                     Normal Ranges:  Asc Ao, d: 3.10 cm (2.1-3.4cm)    LV SYSTOLIC FUNCTION BY 2D PLANIMETRY (MOD):                      Normal Ranges:  EF-A4C View: 60.1 % (>=55%)  EF-A2C View: 67.6 %  EF-Biplane:  61.7 %    LV DIASTOLIC FUNCTION:                             Normal Ranges:  MV Peak E:    0.39 m/s    (0.7-1.2 m/s)  MV Peak A:    1.02 m/s    (0.42-0.7 m/s)  E/A Ratio:    0.39        (1.0-2.2)  MV lateral e' 0.08 m/s  MV medial e'  0.05 m/s  MV A Dur:     143.00 msec    AORTIC VALVE:                          Normal Ranges:  AoV Vmax:      1.14 m/s (<=1.7m/s)  AoV Peak P.2 mmHg (<20mmHg)  LVOT Max Jama:  0.62 m/s (<=1.1m/s)  LVOT VTI:      7.68 cm  LVOT Diameter: 1.80 cm  (1.8-2.4cm)  AoV Area,Vmax: 1.37 cm2 (2.5-4.5cm2)       RIGHT VENTRICLE:  RV Basal 2.92 cm  RV Mid   1.70 cm  RV Major 5.5 cm  TAPSE:   19.3 mm  RV s'    0.10 m/s    TRICUSPID VALVE/RVSP:                              Normal Ranges:  Peak TR Velocity: 3.63 m/s  RV Syst Pressure: 55.7 mmHg (< 30mmHg)       [id number] Viridiana Burgos MD  Electronically signed on 2024 at 8:20:05 AM       ** Final **    Results from last 7 days   Lab Units 24  0627   WBC AUTO x10*3/uL 8.8   HEMOGLOBIN g/dL 11.8*   HEMATOCRIT % 39.4   PLATELETS AUTO x10*3/uL 133*      Results from last 7 days   Lab Units 24  0630   SODIUM mmol/L 134*   POTASSIUM mmol/L 3.9   CHLORIDE mmol/L 105   CO2 mmol/L 20*   BUN mg/dL 33*   CREATININE mg/dL 1.57*   GLUCOSE mg/dL 75   CALCIUM mg/dL 7.9*      In the review of systems is weakness lethargy no fever no vomiting or diarrhea    Physical Exam  Lungs are diminished but clear heart has regular rate and rhythm right foot is dressed  Relevant Results               Assessment/Plan      Cellulitis of the right foot there is no osteomyelitis there is soft tissue areas of the foot cellulitis which needs to be treated    Pleural effusions and pneumonia probably more pulmonary vascular congestion than actual infiltrates but no plans for thoracentesis.  I am going to go ahead and treat her probably with a small dose of Lasix and I am going to treat that with Augmentin twice daily I think it will cover any infiltrate that she may have as well as collectively treating the right foot cellulitis that  she has as well      She has a urinary tract infection it is growing Klebsiella pneumonia Amanda go ahead and treat that it is susceptible to Augmentin so that will be treated as well in turn both with the cellulitis of the foot and the possible infiltrate of the lung I will also treat this as well    I spoke with the son at length in the room with the nurse they both understand what I am describing and I am probably going to set that up for discharge tomorrow family is very insistent they want her to come home they will take care of her there I have no problem with that I will go ahead and make arrangements for her to go home tomorrow    Today in completion I completed both a face-to-face evaluation examination gathering of all the information that included review of tests that have been completed.  I have also reviewed all of the laboratory and the test that I have ordered I also interpreted all that personally today and completion I reviewed the imaging studies as well I also included the medically appropriate examination and the counseling and educating of the family the son and specifics in the ordering of all the medications tests and/or the procedure that I have completed independently interpreting all that and communicating it into a discharge diet will occur tomorrow and we will get her home and I will review all the labs and the medications that I want her on before discharge thank you            Principal Problem:    Cellulitis of foot          Malnutrition Diagnosis Status: New  Malnutrition Diagnosis: Severe malnutrition related to chronic disease or condition  As Evidenced by: Severe muscle and fat loss noted on physical exam.  Pt is 72% of her IBW.  She has dementia  I agree with the dietitian's malnutrition diagnosis.         Anshul Morris DO

## 2024-04-23 NOTE — PROGRESS NOTES
"Joann Blank is a 89 y.o. female on day 1 of admission presenting with Cellulitis of foot.    Subjective   Pt seen at bedside. Granddaughter sitting bedside. Family questions were answered. No apparent overnight events. Has been elevating foot on pillow. Offloading boots intact. Pt denies any constitutional symptoms. No other pedal complaints at this time.        Physical Exam    Objective     REVIEW OF SYSTEMS  A 10+ point ROS was completed and otherwise negative except as noted above and per HPI.     Objective:   Vasc: DP and PT pulses are faintly bilateral.  CFT is less than 3 seconds bilateral.  Skin temperature is warm to cool proximal to distal bilateral.  erythema to the dorsum of the foot      Neuro:  Light touch is intact to the foot bilateral.  Protective sensation is intact to the foot when tested with the 5.07 SWM bilateral.  There is no clonus noted.  The hallux is downgoing bilateral.       Derm: Skin is supple with normal texture and turgor noted.  Webspaces are clean, dry and intact bilateral.  There are no hyperkeratoses, ulcerations, verruca or other lesions noted.  Pressure ulceration of right heel with necrotic cap that is well adhered, no fluctuance underneath. Periwound erythema. Pressure ulceration of right lateral 5th toe and metatarsal area with necrotic cap that is well adhered with no fluctuance. Periwound erythema noted. Left calf has start of eccymosis.      Ortho: Muscle strength is 5/5 for all pedal groups tested.  Ankle joint, subtalar joint, 1st MPJ and lesser MPJ ROM is full and without pain or crepitus.  The foot type is rectus bilateral off weight bearing.  There are no structural deformities noted.         Last Recorded Vitals  Blood pressure 129/55, pulse 90, temperature 35.9 °C (96.6 °F), resp. rate 18, height 1.575 m (5' 2.01\"), weight (!) 36.3 kg (79 lb 15.7 oz), SpO2 (!) 88%.    Intake/Output last 3 Shifts:  I/O last 3 completed shifts:  In: 1229 (33.9 mL/kg) [P.O.:360; " I.V.:269 (7.4 mL/kg); IV Piggyback:600]  Out: 1700 (46.8 mL/kg) [Urine:1700 (1.3 mL/kg/hr)]  Dosing Weight: 36.3 kg     WBC downtrending, 9.8  Lactate down trending 2.5  Elevated CRP, normal ESR  XR showed no osseous involvement, though soft tissue focal gas on lateral right pressure area consistent with ulcer  MRI not tolerated, but no OM on images seen.    Assessment/Plan   Principal Problem:    Cellulitis of foot  # Unstageable pressure ulceration, right heel  # Unstageable pressure ulceration, right lateral foot  # unstageable pressuer ulceration, left heel   # DTI left calf  # Cellulitis, right foot  # PAD     -Patient was seen and evaluated; all findings were discussed and all questions were answered to patient's satisfaction.  - Charts, labs, vitals and imaging all reviewed.   - Imaging:  Read noted above. No evidence of osteomyelitis in the posterior calcaneus or the 4th digit  - Labs: reviewed  - PVRs: pending   - Abx:  vanc/zosyn     RECOMMENDATIONS:   - Continue with IV abx per primary, responding well  - PVRs ordered to assess possible underlying vascular disease  - MRI right foot reviewed, no OM seen on images captured. MRI not tolerated  - Currently no surgical intervention, due to family expectations and age, most likely will not elect surgery.   - Planned conservative treatment with daily dressing changes of betadine to keep wound dry and stable  - Dressings: betadine paint, covered with 4x4s, and paper tape  - Dressing orders placed.   - Prefer TruVue boots for offloading; however, patient's heels and legs can be offloaded with pillows until boots are available. Please make sure patients heels are floating and not in contact with bed to avoid further deep tissue injury  - will continue following while in house      Case to be discussed with attending, A&P above reflect tentative plan. Please await for final signature from attending physician on service.     Ashu Sahu DPM PGY-1  Podiatric  Medicine & Surgery  [place]

## 2024-04-23 NOTE — PROGRESS NOTES
Subjective:  Resting in bed.  Off oxygen.  Spoke with daughter-in-law.    Physical Exam:  General: Chronically ill-appearing, frail  HEENT: no obvious lesions  Neck: supple without obviously elevated JVP  Cardiovascular: regular rate, regular rhythm, no peripheral edema  Respiratory: Bibasilar crackles, shallow breathing  Abdominal: no organomegaly or tenderness to palpation, no peritoneal signs  MSK: grossly normal ROM without deformities  Skin: Right lower extremity is wrapped however from what I am able to see she has necrotic ulcerated lesions on her right calf  Neurologic: Difficult to assess  Psychatric: Calm    Visit Vitals  BP (!) 194/91 (BP Location: Right arm, Patient Position: Lying)   Pulse 77   Temp 36.3 °C (97.3 °F) (Temporal)   Resp 18        All other imaging, labs, and recent documents were reviewed and discussed.    Assessment & Plan:  89-year-old presenting for evaluation of right lower extremity wounds.  Pulmonary consulted for pleural effusion.    Daily progress:  4/23: Off oxygen this morning, seen resting in bed, does not appear to be in respiratory distress, no accessory muscle use, respiratory rate around 12, no plans for thoracentesis, continue fluid removal per cardiology recommendations    Impression and recommendations:    1.  Pleural effusions, bilateral  Likely chronic from heart failure.  Optimize volume removal.  No plans for thoracentesis.    2.  Acute hypoxic respiratory failure in the setting of above  Off oxygen 4/23.    3.  Right lower extremity necrotic wounds  Being followed by podiatry.   Suspect arterial insufficiency ulcers with dry gangrene.  No osteomyelitis.  Antibiotics per primary team.     4.  Frailty  BMI less than 15, albumin less than 2.  Apparently has not been eating for several years.  Prognosis extremely guarded.  Palliative/hospice appropriate.    Please await attending attestation for finalized plan.    Raymundo Tomas, DO    This note was entered with  assistance of voice recognition software, minor typographic errors may be present.

## 2024-04-23 NOTE — PROGRESS NOTES
"Vancomycin Dosing by Pharmacy- FOLLOW UP    Joann Blank is a 89 y.o. year old female who Pharmacy has been consulted for vancomycin dosing for cellulitis, skin and soft tissue. Based on the patient's indication and renal status this patient is being dosed based on a goal trough/random level of 15-20.     Renal function is currently declining.    Current vancomycin dose: 500 mg given every dose by level hours    Most recent random level: 12 mcg/mL    Visit Vitals  /83   Pulse 80   Temp 35.8 °C (96.4 °F)   Resp 18        Lab Results   Component Value Date    CREATININE 1.47 (H) 04/22/2024    CREATININE 1.68 (H) 04/21/2024    CREATININE 1.81 (H) 04/20/2024    CREATININE 1.86 (H) 04/19/2024        Patient weight is No results found for: \"PTWEIGHT\"    No results found for: \"CULTURE\"     I/O last 3 completed shifts:  In: 1499 (41.3 mL/kg) [P.O.:940; I.V.:309 (8.5 mL/kg); IV Piggyback:250]  Out: 250 (6.9 mL/kg) [Urine:250 (0.2 mL/kg/hr)]  Dosing Weight: 36.3 kg   [unfilled]    No results found for: \"PATIENTTEMP\"     Assessment/Plan    Within goal random/trough level    This dosing regimen is predicted by [employer] to result in the following pharmacokinetic parameters:  Dose by levels. 500 mg x 1 on 4/21. Trough = [12].   500 mg x 1 for 4/22    The next level will be obtained on 4/23 at 2100. May be obtained sooner if clinically indicated.   Will continue to monitor renal function daily while on vancomycin and order serum creatinine at least every 48 hours if not already ordered.  Follow for continued vancomycin needs, clinical response, and signs/symptoms of toxicity.       Arian Weller, PharmD           "

## 2024-04-23 NOTE — PROGRESS NOTES
"Joann Blank is a 89 y.o. female on day 1 of admission presenting with Cellulitis of foot.    Subjective   Upon assessment of the patient this morning, the patient was sleeping and in no obvious distress; she was arousable to verbal stimuli, and smiled when spoken to.  The patient's daughter, Karrie, was at bedside, and stated the family look forward to taking the patient home.    Objective     Physical Exam  Constitutional:       General: She is not in acute distress.     Appearance: She is not ill-appearing.      Comments:  She is very frail looking and cachectic   HENT:      Mouth/Throat: Oral mucosa noted to be moist.     Pharynx: Oropharynx is clear.   Eyes:      Pupils: Pupils are equal, round, and reactive to light.   Cardiovascular:      Rate and Rhythm: Normal rate and regular rhythm.      Heart sounds: Normal heart sounds.   Pulmonary:      Effort: No respiratory distress.      Breath sounds: Normal breath sounds. No wheezing or rhonchi.   Abdominal:      General: Abdomen is flat. Bowel sounds are normal. There is no distension.      Palpations: Abdomen is soft.      Tenderness: There is no abdominal tenderness.   Musculoskeletal:         General: No swelling.      Comments: Significant muscle wasting noted.  Bilateral feet wound dressings that are dry clean and intact   Skin:     General: Skin is dry.      Coloration: Skin is pale.   Neurological:      General: No focal deficit present.   Psychiatric:         Mood and Affect: Mood normal.         Behavior: Behavior normal.     Last Recorded Vitals  Blood pressure (!) 194/91, pulse 77, temperature 36.3 °C (97.3 °F), temperature source Temporal, resp. rate 18, height 1.575 m (5' 2\"), weight (!) 36.3 kg (80 lb), SpO2 95%.  Intake/Output last 3 Shifts:  I/O last 3 completed shifts:  In: 1229 (33.9 mL/kg) [P.O.:360; I.V.:269 (7.4 mL/kg); IV Piggyback:600]  Out: 1700 (46.8 mL/kg) [Urine:1700 (1.3 mL/kg/hr)]  Dosing Weight: 36.3 kg     Relevant " Results  Scheduled medications  amLODIPine, 5 mg, oral, Daily  balsam peru-castor oiL, , Topical, BID  cranberry, 500 mg, oral, Daily  donepezil, 5 mg, oral, Daily  furosemide, 20 mg, intravenous, Once  heparin (porcine), 5,000 Units, subcutaneous, q8h ESVIN  [Held by provider] losartan, 50 mg, oral, Daily  piperacillin-tazobactam, 2.25 g, intravenous, q6h  traZODone, 50 mg, oral, Nightly      Continuous medications     PRN medications  PRN medications: acetaminophen, dextrose, dextrose, glucagon, glucagon, hydrALAZINE, ipratropium-albuteroL, polyethylene glycol, traMADol, vancomycin    Results for orders placed or performed during the hospital encounter of 04/19/24 (from the past 24 hour(s))   Transthoracic Echo (TTE) Complete   Result Value Ref Range    AV pk michael 1.14 m/s    LVOT diam 1.80 cm    LV Biplane EF 62 %    MV E/A ratio 0.39     LA vol index A/L 21.6 ml/m2    Tricuspid annular plane systolic excursion 1.9 cm    RV free wall pk S' 10.20 cm/s    LVIDd 2.77 cm    RVSP 55.7 mmHg    Aortic Valve Area by Continuity of Peak Velocity 1.37 cm2    AV pk grad 5.2 mmHg    LV A4C EF 60.1    POCT GLUCOSE   Result Value Ref Range    POCT Glucose 87 74 - 99 mg/dL   Vancomycin, Trough   Result Value Ref Range    Vancomycin, Trough 12.1 5.0 - 20.0 ug/mL   Comprehensive Metabolic Panel   Result Value Ref Range    Glucose 75 74 - 99 mg/dL    Sodium 134 (L) 136 - 145 mmol/L    Potassium 3.9 3.5 - 5.3 mmol/L    Chloride 105 98 - 107 mmol/L    Bicarbonate 20 (L) 21 - 32 mmol/L    Anion Gap 13 10 - 20 mmol/L    Urea Nitrogen 33 (H) 6 - 23 mg/dL    Creatinine 1.57 (H) 0.50 - 1.05 mg/dL    eGFR 30 (L) >60 mL/min/1.73m*2    Calcium 7.9 (L) 8.6 - 10.3 mg/dL    Albumin 2.2 (L) 3.4 - 5.0 g/dL    Alkaline Phosphatase 99 33 - 136 U/L    Total Protein 5.7 (L) 6.4 - 8.2 g/dL    AST 20 9 - 39 U/L    Bilirubin, Total 0.9 0.0 - 1.2 mg/dL    ALT 12 7 - 45 U/L     Transthoracic Echo (TTE) Complete    Result Date: 4/23/2024    [facility]  [place], [id number] [address]Sys [phone] and                                 Fax [phone] TRANSTHORACIC ECHOCARDIOGRAM REPORT  Patient Name:      SUZI COLLINS         Patricia Physician:    [id number] Viridiana Burgos MD Study Date:        4/22/2024            Ordering Provider:    [id number] EMRE LLAMASTON MRN/PID:           [id number]             Fellow: Accession#:        [id number]         Nurse: Date of Birth/Age: 11/25/1929 / 89      Sonographer:          Lynn sr                                      LUCERO Gender:            F                    Additional Staff: Height:            157.48 cm            Admit Date:           4/20/2024 Weight:            36.29 kg             Admission Status:     Inpatient -                                                               Routine BSA / BMI:         1.29 m2 / 14.63      Encounter#:           [id number]                    kg/m2                                         Department Location:  [facility] Blood Pressure: 176 /80 mmHg Study Type:    TRANSTHORACIC ECHO (TTE) COMPLETE Diagnosis/ICD: Pleural Effusion-J90; Acute pulmonary edema-J81.0 Indication:    Elevated BNP CPT Code:      Echo Complete w Full Doppler-51294 Patient History: Pertinent History: HTN. Study Detail: The following Echo studies were performed: 2D, M-Mode, Doppler and               color flow. Technically challenging study due to patient lying in               supine position and body habitus.  PHYSICIAN INTERPRETATION: Left Ventricle: The left ventricular systolic function is normal, with an estimated ejection fraction of 60-65%. There are no regional wall motion abnormalities. The left ventricular cavity size is decreased. There is moderate concentric left ventricular hypertrophy. Spectral Doppler shows an impaired relaxation pattern of left ventricular diastolic filling. Left Atrium: The  left atrium is normal in size. Right Ventricle: The right ventricle is normal in size. There is normal right ventricular global systolic function. Right Atrium: The right atrium is normal in size. Aortic Valve: The aortic valve is trileaflet. There is mild to moderate aortic valve thickening. There is evidence of mildly elevated transaortic gradients consistent with sclerosis of the aortic valve. There is no evidence of aortic valve regurgitation. The peak instantaneous gradient of the aortic valve is 5.2 mmHg. Mitral Valve: The mitral valve is normal in structure. There is no evidence of mitral valve regurgitation. Tricuspid Valve: The tricuspid valve is structurally normal. There is moderate to severe tricuspid regurgitation. The Doppler estimated RVSP is moderate to severely elevated at 55.7 mmHg. Pulmonic Valve: The pulmonic valve is structurally normal. There is physiologic pulmonic valve regurgitation. Pericardium: There is no pericardial effusion noted. Aorta: The aortic root is normal.  CONCLUSIONS:  1. Left ventricular systolic function is normal with a 60-65% estimated ejection fraction.  2. Spectral Doppler shows an impaired relaxation pattern of left ventricular diastolic filling.  3. There is moderate concentric left ventricular hypertrophy.  4. Moderate to severe tricuspid regurgitation visualized.  5. Moderate to severely elevated right ventricular systolic pressure.  6. Aortic valve sclerosis.  7. Left ventricular cavity size is decreased. QUANTITATIVE DATA SUMMARY: 2D MEASUREMENTS:                          Normal Ranges: Ao Root d:     2.80 cm   (2.0-3.7cm) LAs:           2.80 cm   (2.7-4.0cm) IVSd:          1.48 cm   (0.6-1.1cm) LVPWd:         0.99 cm   (0.6-1.1cm) LVIDd:         2.77 cm   (3.9-5.9cm) LVIDs:         1.90 cm LV Mass Index: 79.3 g/m2 LV % FS        31.4 % LA VOLUME:                               Normal Ranges: LA Vol A4C:        32.6 ml    (22+/-6mL/m2) LA Vol A2C:        23.5 ml LA  Vol BP:         27.9 ml LA Vol Index A4C:  25.2ml/m2 LA Vol Index A2C:  18.1 ml/m2 LA Vol Index BP:   21.6 ml/m2 LA Area A4C:       14.4 cm2 LA Area A2C:       12.1 cm2 LA Major Axis A4C: 5.4 cm LA Major Axis A2C: 5.3 cm LA Volume Index:   20.5 ml/m2 LA Vol A4C:        30.8 ml LA Vol A2C:        22.4 ml RA VOLUME BY A/L METHOD:                       Normal Ranges: RA Area A4C: 16.4 cm2 M-MODE MEASUREMENTS:                  Normal Ranges: Ao Root: 3.00 cm (2.0-3.7cm) LAs:     3.40 cm (2.7-4.0cm) AORTA MEASUREMENTS:                    Normal Ranges: Asc Ao, d: 3.10 cm (2.1-3.4cm) LV SYSTOLIC FUNCTION BY 2D PLANIMETRY (MOD):                     Normal Ranges: EF-A4C View: 60.1 % (>=55%) EF-A2C View: 67.6 % EF-Biplane:  61.7 % LV DIASTOLIC FUNCTION:                           Normal Ranges: MV Peak E:    0.39 m/s    (0.7-1.2 m/s) MV Peak A:    1.02 m/s    (0.42-0.7 m/s) E/A Ratio:    0.39        (1.0-2.2) MV lateral e' 0.08 m/s MV medial e'  0.05 m/s MV A Dur:     143.00 msec AORTIC VALVE:                         Normal Ranges: AoV Vmax:      1.14 m/s (<=1.7m/s) AoV Peak P.2 mmHg (<20mmHg) LVOT Max Jama:  0.62 m/s (<=1.1m/s) LVOT VTI:      7.68 cm LVOT Diameter: 1.80 cm  (1.8-2.4cm) AoV Area,Vmax: 1.37 cm2 (2.5-4.5cm2)  RIGHT VENTRICLE: RV Basal 2.92 cm RV Mid   1.70 cm RV Major 5.5 cm TAPSE:   19.3 mm RV s'    0.10 m/s TRICUSPID VALVE/RVSP:                             Normal Ranges: Peak TR Velocity: 3.63 m/s RV Syst Pressure: 55.7 mmHg (< 30mmHg)  [id number] Viridiana Burgos MD Electronically signed on 2024 at 8:20:05 AM  ** Final **        Assessment/Plan   IMP:  89-year-old Tamazight speaking female with past medical history of cognitive impairment, severe protein calorie malnutrition, hypertension, osteoporosis, recurrent urinary tract infections who presented to the emergency room from home for right foot wounds.  On presentation, blood workup showed leukocytosis, lactic acidosis, hypoalbuminemia, and an acute kidney  injury.  Patient was admitted to the medical service.  Started on IV antibiotics.  Podiatry consulted.  Palliative care consulted for advanced care planning.     4/20/2024-   I had a very lengthy discussion with the family at bedside.  I started the meeting by introducing the practice of palliative care and the services it provides.  I then reviewed at length with patient/family the clinical diagnosis/medical problems that the patient presented with and the treatments provided so far. We discussed the implications of the current circumstances and option plans going forward.  We then reviewed at length the nature of the patient's primary disease--severe protein calorie malnutrition and cognitive impairment, its progression, and potential complications in the long run.  I then tried to my best answering all questions patient/family had and attending to their concerns via reflective listening.     I told the family that based on the information that they provided me, and unless proven otherwise, patient most likely has Alzheimers disease that is moderate stage.  Fast score 6c and PPS 30%.  But despite having the moderate stage of the disease, her overall prognosis is guarded given her underlying severe protein calorie malnutrition and significant functional limitation.  Family demonstrated understanding.  Family wanted to know more about options of discharge and placement after this hospitalization.  I told them that for now, we will need to wait until further input from podiatry.  MRI has been ordered.  And we need to rule out bone involvement from the infection.  If there is a bone involvement, then patient may require long-term antibiotic therapy intravenously.  And in such a case, we will need to explore options on whether this can be given at home or not especially that family wishes to have the patient go home.  So I presented him with the following options depending on the outcomes.     1.  Home health care with  palliative care follow-up; and I did explain in details what such services provided.  But again, this will all depend on the outpatient wound management recommendation by podiatry and whether this can be managed at home or not.  Also whether we will need IV antibiotics or not.     2.  Hospice care.  I introduced the philosophy of hospice care and the services it provides.  I explained how hospice attends to patient's quality of life and dignity while the disease takes its natural course.  I emphasized how hospice focuses on decreasing the burden of the disease and providing comfort not only for the patient but also for the family and caregivers.     Family demonstrated good understanding of all the information I provided.  We will wait till Monday and decide on the final step.     I also discussed with them the advance directives.  They said that patient does have a DNR CCA and her papers.  So I did change her CODE STATUS.  On the other hand, she does not have advanced directives or healthcare proxy.  So I told them in such a case could, it would be her son as her next of kin.  I did update  on call today regarding the conversation I had.    4/23/2024-   The patient did not appear to be in any obvious distress, and so no additional medications will be recommended per our service at this time.  I did speak with the patient's daughter, Karrie, at the bedside, and she stated the family are looking forward to taking the patient home with palliative care.  Karrie stated they will need a hospital bed at home for the patient, and I did notify the hospital  of the same.  The podiatrist did speak with the family yesterday, indicating that the patient's foot wounds would require more extensive surgical intervention such as partial amputation, and that the patient is not a prime candidate due to her severe malnutrition and other underlying comorbidities.  It does appear the patient's family are  having a difficult time accepting that the patient is at the end of life, but are open to outpatient palliative care involvement.  Outpatient palliative care will be able to continue goals of care discussions and will be able to transition the patient to hospice services, if and when the patient's family become ready, in the home setting.  I answered the patient's daughter's questions and concerns to the best of my ability and offered emotional support.  Palliative care will continue to follow.     Thank you for allowing us to participate in the care of this lady.  Should you have any further questions or concerns regarding her care, please do not hesitate to contact us via [employer] (Ana Baez during weekdays work hours and Rajinder Reynolds during weekdays after hours and over the weekend)     (This note was generated with voice recognition software and may contain errors including spelling, grammar, syntax and misrecognition of what was dictated, that are not fully corrected)      Patient/proxy preference for information  Prefers full information    Goals of Care  The patient remains DNR CCA DNI CODE STATUS.  The patient's family would like to take her home and are open to outpatient palliative care.           I spent 30 minutes in the professional and overall care of this patient.      Ana Baez, APRN-CNP

## 2024-04-24 ENCOUNTER — HOME HEALTH ADMISSION (OUTPATIENT)
Dept: HOME HEALTH SERVICES | Facility: HOME HEALTH | Age: 89
End: 2024-04-24
Payer: COMMERCIAL

## 2024-04-24 ENCOUNTER — DOCUMENTATION (OUTPATIENT)
Dept: HOME HEALTH SERVICES | Facility: HOME HEALTH | Age: 89
End: 2024-04-24
Payer: COMMERCIAL

## 2024-04-24 VITALS
OXYGEN SATURATION: 94 % | HEART RATE: 85 BPM | TEMPERATURE: 97.2 F | HEIGHT: 62 IN | DIASTOLIC BLOOD PRESSURE: 77 MMHG | SYSTOLIC BLOOD PRESSURE: 160 MMHG | BODY MASS INDEX: 14.72 KG/M2 | RESPIRATION RATE: 18 BRPM | WEIGHT: 79.98 LBS

## 2024-04-24 LAB
ATRIAL RATE: 125 BPM
BACTERIA BLD CULT: NORMAL
BACTERIA BLD CULT: NORMAL
GLUCOSE BLD MANUAL STRIP-MCNC: 117 MG/DL (ref 74–99)
GLUCOSE BLD MANUAL STRIP-MCNC: 138 MG/DL (ref 74–99)
GLUCOSE BLD MANUAL STRIP-MCNC: 86 MG/DL (ref 74–99)
P AXIS: 58 DEGREES
PR INTERVAL: 133 MS
Q ONSET: 249 MS
QRS COUNT: 12 BEATS
QRS DURATION: 91 MS
QT INTERVAL: 446 MS
QTC CALCULATION(BAZETT): 453 MS
QTC FREDERICIA: 451 MS
R AXIS: 14 DEGREES
T AXIS: 33 DEGREES
T OFFSET: 472 MS
VENTRICULAR RATE: 62 BPM

## 2024-04-24 PROCEDURE — 2500000001 HC RX 250 WO HCPCS SELF ADMINISTERED DRUGS (ALT 637 FOR MEDICARE OP): Performed by: NURSE PRACTITIONER

## 2024-04-24 PROCEDURE — 2500000004 HC RX 250 GENERAL PHARMACY W/ HCPCS (ALT 636 FOR OP/ED): Performed by: INTERNAL MEDICINE

## 2024-04-24 PROCEDURE — 2500000001 HC RX 250 WO HCPCS SELF ADMINISTERED DRUGS (ALT 637 FOR MEDICARE OP): Performed by: INTERNAL MEDICINE

## 2024-04-24 PROCEDURE — 2500000004 HC RX 250 GENERAL PHARMACY W/ HCPCS (ALT 636 FOR OP/ED): Performed by: NURSE PRACTITIONER

## 2024-04-24 PROCEDURE — 99232 SBSQ HOSP IP/OBS MODERATE 35: CPT

## 2024-04-24 PROCEDURE — 82947 ASSAY GLUCOSE BLOOD QUANT: CPT | Mod: 91

## 2024-04-24 RX ORDER — L. ACIDOPHILUS/L.BULGARICUS 1MM CELL
2 TABLET ORAL 2 TIMES DAILY
Qty: 120 TABLET | Refills: 0 | Status: SHIPPED | OUTPATIENT
Start: 2024-04-24 | End: 2024-04-24

## 2024-04-24 RX ORDER — AMOXICILLIN AND CLAVULANATE POTASSIUM 250; 125 MG/1; MG/1
1 TABLET, FILM COATED ORAL 2 TIMES DAILY
Status: DISCONTINUED | OUTPATIENT
Start: 2024-04-24 | End: 2024-04-24 | Stop reason: HOSPADM

## 2024-04-24 RX ORDER — L. ACIDOPHILUS/L.BULGARICUS 1MM CELL
2 TABLET ORAL 2 TIMES DAILY
Status: DISCONTINUED | OUTPATIENT
Start: 2024-04-24 | End: 2024-04-24 | Stop reason: HOSPADM

## 2024-04-24 RX ORDER — AMOXICILLIN AND CLAVULANATE POTASSIUM 875; 125 MG/1; MG/1
1 TABLET, FILM COATED ORAL 2 TIMES DAILY
Qty: 28 TABLET | Refills: 0 | Status: SHIPPED | OUTPATIENT
Start: 2024-04-24 | End: 2024-05-08

## 2024-04-24 RX ORDER — AMLODIPINE BESYLATE 5 MG/1
5 TABLET ORAL DAILY
Qty: 30 TABLET | Refills: 3 | Status: SHIPPED | OUTPATIENT
Start: 2024-04-25

## 2024-04-24 RX ORDER — AMOXICILLIN AND CLAVULANATE POTASSIUM 875; 125 MG/1; MG/1
1 TABLET, FILM COATED ORAL 2 TIMES DAILY
Qty: 14 TABLET | Refills: 0 | Status: SHIPPED | OUTPATIENT
Start: 2024-04-24 | End: 2024-04-24

## 2024-04-24 RX ORDER — L. ACIDOPHILUS/L.BULGARICUS 1MM CELL
2 TABLET ORAL 2 TIMES DAILY
Qty: 120 TABLET | Refills: 0 | Status: SHIPPED | OUTPATIENT
Start: 2024-04-24 | End: 2024-05-24

## 2024-04-24 RX ORDER — AMLODIPINE BESYLATE 5 MG/1
5 TABLET ORAL DAILY
Qty: 30 TABLET | Refills: 3 | Status: SHIPPED | OUTPATIENT
Start: 2024-04-25 | End: 2024-04-24

## 2024-04-24 RX ADMIN — AMLODIPINE BESYLATE 5 MG: 5 TABLET ORAL at 09:38

## 2024-04-24 RX ADMIN — Medication 2 TABLET: at 15:52

## 2024-04-24 RX ADMIN — PIPERACILLIN SODIUM AND TAZOBACTAM SODIUM 2.25 G: 2; .25 INJECTION, SOLUTION INTRAVENOUS at 10:28

## 2024-04-24 RX ADMIN — PIPERACILLIN SODIUM AND TAZOBACTAM SODIUM 2.25 G: 2; .25 INJECTION, SOLUTION INTRAVENOUS at 04:33

## 2024-04-24 RX ADMIN — CASTOR OIL AND BALSAM, PERU: 788; 87 OINTMENT TOPICAL at 09:42

## 2024-04-24 RX ADMIN — HYDRALAZINE HYDROCHLORIDE 5 MG: 20 INJECTION INTRAMUSCULAR; INTRAVENOUS at 05:26

## 2024-04-24 RX ADMIN — Medication 500 MG: at 09:00

## 2024-04-24 RX ADMIN — AMOXICILLIN AND CLAVULANATE POTASSIUM 1 TABLET: 250; 125 TABLET, FILM COATED ORAL at 17:03

## 2024-04-24 RX ADMIN — DONEPEZIL HYDROCHLORIDE 5 MG: 5 TABLET, FILM COATED ORAL at 09:38

## 2024-04-24 RX ADMIN — HEPARIN SODIUM 5000 UNITS: 5000 INJECTION INTRAVENOUS; SUBCUTANEOUS at 05:20

## 2024-04-24 ASSESSMENT — COGNITIVE AND FUNCTIONAL STATUS - GENERAL
TURNING FROM BACK TO SIDE WHILE IN FLAT BAD: A LOT
STANDING UP FROM CHAIR USING ARMS: TOTAL
MOVING TO AND FROM BED TO CHAIR: TOTAL
MOBILITY SCORE: 8
HELP NEEDED FOR BATHING: TOTAL
DAILY ACTIVITIY SCORE: 6
PERSONAL GROOMING: TOTAL
DRESSING REGULAR UPPER BODY CLOTHING: TOTAL
EATING MEALS: TOTAL
CLIMB 3 TO 5 STEPS WITH RAILING: TOTAL
WALKING IN HOSPITAL ROOM: TOTAL
MOVING FROM LYING ON BACK TO SITTING ON SIDE OF FLAT BED WITH BEDRAILS: A LOT
DRESSING REGULAR LOWER BODY CLOTHING: TOTAL
TOILETING: TOTAL

## 2024-04-24 ASSESSMENT — PAIN SCALES - GENERAL: PAINLEVEL_OUTOF10: 0 - NO PAIN

## 2024-04-24 NOTE — HH CARE COORDINATION
This referral has been made a Non Admit with  Home Care due to  Patient discharging with Hospice care . If you have further questions, feel free to reach out to our office at [phone]. Thank you, [facility] Intake.

## 2024-04-24 NOTE — PROGRESS NOTES
"    Subjective   Patient is alert today urine output adequate vitals are stable.  Denies any complaints  Objective          Vitals 24HR  Heart Rate:  [73-90]   Temp:  [35.9 °C (96.6 °F)-36.8 °C (98.2 °F)]   Resp:  [16-18]   BP: (129-193)/(55-90)   Height:  [157.5 cm (5' 2.01\")]   Weight:  [36.3 kg (79 lb 15.7 oz)]   SpO2:  [86 %-95 %]         Intake/Output last 3 Shifts:    Intake/Output Summary (Last 24 hours) at 4/24/2024 1135  Last data filed at 4/24/2024 0425  Gross per 24 hour   Intake --   Output 1200 ml   Net -1200 ml     Results for orders placed or performed during the hospital encounter of 04/19/24 (from the past 24 hour(s))   POCT GLUCOSE   Result Value Ref Range    POCT Glucose 100 (H) 74 - 99 mg/dL   POCT GLUCOSE   Result Value Ref Range    POCT Glucose 97 74 - 99 mg/dL   POCT GLUCOSE   Result Value Ref Range    POCT Glucose 112 (H) 74 - 99 mg/dL   Vancomycin   Result Value Ref Range    Vancomycin 19.5 5.0 - 20.0 ug/mL   POCT GLUCOSE   Result Value Ref Range    POCT Glucose 86 74 - 99 mg/dL       MR foot right wo IV contrast    Result Date: 4/21/2024  Interpreted By:  Brent Giraldo, STUDY: MRI of the  right foot without IV contrast;  4/20/2024 2:58 pm   INDICATION: Signs/Symptoms:pressure ulcerations right foot. Xr showed focal gas.   COMPARISON: None.   ACCESSION NUMBER(S): [id number]   ORDERING CLINICIAN: EDILSON ALEGRE   TECHNIQUE: MR imaging of the  right foot was obtained  without administration of intravenous contrast medium. The patient is limited by motion. Effusion consist were not able to be completed as the patient was unable to tolerate the exam.   FINDINGS: Skin and soft tissue defect present in the posterior aspect of the calcaneus. There is associated cellulitis. No definite marrow edema or loss of the T1 signal seen on these views.   Markedly limited evaluation of the forefoot due to motion. There is diffuse soft tissue edema in the forefoot especially dorsally. No discrete soft " tissue collection seen.   No evidence of abnormal marrow edema or osteomyelitis by MRI in the 4th toe.   Moderate muscle in the plantar musculature suggestive of ischemic myopathy.   Evaluation of tendons or ligaments is markedly limited due to the limitations of the study. No severe tenosynovitis seen. There is no fracture in the visualized osseous structures       Study is limited and incomplete as the patient was unable to tolerate the exam.   1. Within this limitation no evidence of osteomyelitis in the posterior calcaneus or the 4th digit. Ulcers are present at these 2 locations with associated cellulitis and phlegmonous changes. If there is persistent clinical concern however repeat MR imaging with sedation can be performed 2. 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 This study was interpreted at [facility], [place], [place].   MACRO: None   Signed by: Brent Giraldo 4/21/2024 9:49 AM Dictation workstation:   [id number]    Physical Exam  HEENT; enophthalmos pupils react light accommodation  Neck; no JVD no bruit no thyromegaly  Lungs; positive crackles on inspiration no wheezing  Heart; no gallops or rubs regular rate  Abdomen; active peristalsis no rebound or guarding  Neurological; no tics or tremors no asterixis    Assessment/Plan    Acute kidney injury  Oliguria improving   transaminitis  UTI  Pneumonia  Secondary hyperparathyroidism  Plan continue current medications and therapy          Ciro Chávez MD

## 2024-04-24 NOTE — PROGRESS NOTES
Subjective:  Looks about the same.  Apparently plan is for hospice.    Physical Exam:  General: Chronically ill-appearing, frail  HEENT: no obvious lesions  Neck: supple without obviously elevated JVP  Cardiovascular: regular rate, regular rhythm, no peripheral edema  Respiratory: Bibasilar crackles, shallow breathing  Abdominal: no organomegaly or tenderness to palpation, no peritoneal signs  MSK: grossly normal ROM without deformities  Skin: Right lower extremity is wrapped however from what I am able to see she has necrotic ulcerated lesions on her right calf  Neurologic: Difficult to assess  Psychatric: Calm    Visit Vitals  /62   Pulse 84   Temp 36.8 °C (98.2 °F)   Resp 16        All other imaging, labs, and recent documents were reviewed and discussed.    Assessment & Plan:  89-year-old presenting for evaluation of right lower extremity wounds.  Pulmonary consulted for pleural effusion.    Daily progress:  4/24: Status quo, minimal oral intake, labs are fine, on a few liters nasal cannula intermittently, plan is to go home with hospice care, no interventions from pulmonary service    Impression and recommendations:    1.  Pleural effusions, bilateral  Likely chronic from heart failure.  Optimize volume removal.    No plan for thoracentesis.    2.  Acute hypoxic respiratory failure in the setting of above  Intermittently on oxygen.    3.  Right lower extremity necrotic wounds  Being followed by podiatry.   Suspect arterial insufficiency ulcers with dry gangrene.  No osteomyelitis.  Antibiotics per primary team.     4.  Frailty  BMI less than 15, albumin less than 2.  Apparently has not been eating for several years.  Prognosis extremely guarded.  Palliative/hospice appropriate.    As above, patient plan to go home with hospice care.  Appropriate given the circumstance.    Please await attending attestation for finalized plan.    Raymundo Tomas, DO    This note was entered with assistance of voice  recognition software, minor typographic errors may be present.

## 2024-04-24 NOTE — PROGRESS NOTES
04/24/24 1436   Discharge Planning   Living Arrangements Children   Support Systems Children;Family members   Assistance Needed Family provides care at home   Type of Residence Private residence   Who is requesting discharge planning? Other (Comment)   Home or Post Acute Services Community services   Type of Home Care Services Hospice   Patient expects to be discharged to: Home with family and Hospice   Does the patient need discharge transport arranged? Yes   What day is the transport expected? 04/24/24     TCC Note: Pt has written discharge to return home now. Pt will be going home with Hospice with [facility]. Checked with SW to see if there were any last items that Transitions of Care needed to do prior to Discharge. RN notified and informed that pt would need transport back home. Will follow until discharge. Misty Rasmussen, MSN, RN, TCC.

## 2024-04-24 NOTE — PROGRESS NOTES
"Joann Blank is a 89 y.o. female on day 2 of admission presenting with Cellulitis of foot.    Subjective   Upon assessment of the patient this morning, the patient was sleeping without obvious signs of distress.  The patient's daughter, Karrie, was at bedside, and stated the family have collectively agreed that hospice is likely best for the patient.    Objective     Physical Exam  Constitutional:       General: She is not in acute distress.     Appearance: She is not ill-appearing.      Comments:  She is very frail looking and cachectic, sleeping  HENT:      Mouth/Throat: Oral mucosa noted to be moist.     Pharynx: Oropharynx is clear.   Eyes:      Pupils: Deferred due to sleeping  Cardiovascular:      Rate and Rhythm: Normal rate and regular rhythm.      Heart sounds: Normal heart sounds.   Pulmonary:      Effort: No respiratory distress.      Breath sounds: Normal breath sounds. No wheezing or rhonchi.   Abdominal:      General: Abdomen is flat. Bowel sounds are normal. There is no distension.      Palpations: Abdomen is soft.      Tenderness: There is no abdominal tenderness.   Musculoskeletal:         General: No swelling.      Comments: Significant muscle wasting noted.  Bilateral feet wound dressings that are dry clean and intact   Skin:     General: Skin is dry.      Coloration: Skin is pale.   Neurological:      General: Sleeping  Psychiatric:         Mood and Affect: Deferred due to sleeping        Behavior: Behavior normal.     Last Recorded Vitals  Blood pressure (!) 193/90, pulse 73, temperature 36.8 °C (98.2 °F), resp. rate 16, height 1.575 m (5' 2.01\"), weight (!) 36.3 kg (79 lb 15.7 oz), SpO2 95%.  Intake/Output last 3 Shifts:  I/O last 3 completed shifts:  In: 760 (20.9 mL/kg) [P.O.:360; IV Piggyback:400]  Out: 2900 (79.9 mL/kg) [Urine:2900 (2.2 mL/kg/hr)]  Dosing Weight: 36.3 kg     Relevant Results  Scheduled medications  amLODIPine, 5 mg, oral, Daily  balsam peru-castor oiL, , Topical, " BID  cranberry, 500 mg, oral, Daily  donepezil, 5 mg, oral, Daily  heparin (porcine), 5,000 Units, subcutaneous, q8h ESVIN  [Held by provider] losartan, 50 mg, oral, Daily  piperacillin-tazobactam, 2.25 g, intravenous, q6h  traZODone, 50 mg, oral, Nightly      Continuous medications     PRN medications  PRN medications: acetaminophen, dextrose, dextrose, glucagon, glucagon, hydrALAZINE, ipratropium-albuteroL, polyethylene glycol, traMADol, vancomycin    Results for orders placed or performed during the hospital encounter of 04/19/24 (from the past 24 hour(s))   POCT GLUCOSE   Result Value Ref Range    POCT Glucose 100 (H) 74 - 99 mg/dL   POCT GLUCOSE   Result Value Ref Range    POCT Glucose 97 74 - 99 mg/dL   POCT GLUCOSE   Result Value Ref Range    POCT Glucose 112 (H) 74 - 99 mg/dL   Vancomycin   Result Value Ref Range    Vancomycin 19.5 5.0 - 20.0 ug/mL   POCT GLUCOSE   Result Value Ref Range    POCT Glucose 86 74 - 99 mg/dL         Assessment/Plan   IMP:  89-year-old Serbian speaking female with past medical history of cognitive impairment, severe protein calorie malnutrition, hypertension, osteoporosis, recurrent urinary tract infections who presented to the emergency room from home for right foot wounds.  On presentation, blood workup showed leukocytosis, lactic acidosis, hypoalbuminemia, and an acute kidney injury.  Patient was admitted to the medical service.  Started on IV antibiotics.  Podiatry consulted.  Palliative care consulted for advanced care planning.     4/20/2024-   I had a very lengthy discussion with the family at bedside.  I started the meeting by introducing the practice of palliative care and the services it provides.  I then reviewed at length with patient/family the clinical diagnosis/medical problems that the patient presented with and the treatments provided so far. We discussed the implications of the current circumstances and option plans going forward.  We then reviewed at length the  nature of the patient's primary disease--severe protein calorie malnutrition and cognitive impairment, its progression, and potential complications in the long run.  I then tried to my best answering all questions patient/family had and attending to their concerns via reflective listening.     I told the family that based on the information that they provided me, and unless proven otherwise, patient most likely has Alzheimers disease that is moderate stage.  Fast score 6c and PPS 30%.  But despite having the moderate stage of the disease, her overall prognosis is guarded given her underlying severe protein calorie malnutrition and significant functional limitation.  Family demonstrated understanding.  Family wanted to know more about options of discharge and placement after this hospitalization.  I told them that for now, we will need to wait until further input from podiatry.  MRI has been ordered.  And we need to rule out bone involvement from the infection.  If there is a bone involvement, then patient may require long-term antibiotic therapy intravenously.  And in such a case, we will need to explore options on whether this can be given at home or not especially that family wishes to have the patient go home.  So I presented him with the following options depending on the outcomes.     1.  Home health care with palliative care follow-up; and I did explain in details what such services provided.  But again, this will all depend on the outpatient wound management recommendation by podiatry and whether this can be managed at home or not.  Also whether we will need IV antibiotics or not.     2.  Hospice care.  I introduced the philosophy of hospice care and the services it provides.  I explained how hospice attends to patient's quality of life and dignity while the disease takes its natural course.  I emphasized how hospice focuses on decreasing the burden of the disease and providing comfort not only for the  patient but also for the family and caregivers.     Family demonstrated good understanding of all the information I provided.  We will wait till Monday and decide on the final step.     I also discussed with them the advance directives.  They said that patient does have a DNR CCA and her papers.  So I did change her CODE STATUS.  On the other hand, she does not have advanced directives or healthcare proxy.  So I told them in such a case could, it would be her son as her next of kin.  I did update  on call today regarding the conversation I had.     4/23/2024-   The patient did not appear to be in any obvious distress, and so no additional medications will be recommended per our service at this time.  I did speak with the patient's daughter, Karrie, at the bedside, and she stated the family are looking forward to taking the patient home with palliative care.  Karrie stated they will need a hospital bed at home for the patient, and I did notify the hospital  of the same.  The podiatrist did speak with the family yesterday, indicating that the patient's foot wounds would require more extensive surgical intervention such as partial amputation, and that the patient is not a prime candidate due to her severe malnutrition and other underlying comorbidities.  It does appear the patient's family are having a difficult time accepting that the patient is at the end of life, but are open to outpatient palliative care involvement.  Outpatient palliative care will be able to continue goals of care discussions and will be able to transition the patient to hospice services, if and when the patient's family become ready, in the home setting.  I answered the patient's daughter's questions and concerns to the best of my ability and offered emotional support.  Palliative care will continue to follow.    4/24/2024-   The patient did not appear to be in any obvious distress, and so no additional medications  will be recommended per our service at this time.  The patient's daughter, Karrie, was bedside this morning and stated the family have been further discussing the plan of care, and are collectively agreeing that the patient would likely benefit more from hospice services.  I did discuss my support of this plan of care with Karrie, as the patient will have total comfort support through hospice services at home, and hospice services will also be able to provide emotional support to the family.  We did discuss that hospice does not limit life left, as the patient's likely Alzheimer's and malnutrition is causing her to near the end of life, but instead that hospice will support quality of life for the patient with as many days as she remains living, and Karrie verbalized understanding and agreement.  I did notify the hospital  of the family's desire to take the patient home with hospice care.  Palliative care will continue to follow.     Thank you for allowing us to participate in the care of this lady.  Should you have any further questions or concerns regarding her care, please do not hesitate to contact us via [email or web address] (Ana Baez during weekdays work hours and Rajinder Reynolds during weekdays after hours and over the weekend)     (This note was generated with voice recognition software and may contain errors including spelling, grammar, syntax and misrecognition of what was dictated, that are not fully corrected)       Patient/proxy preference for information  Prefers full information    Goals of Care  The patient remains DNR CCA DNI CODE STATUS.  The patient's family would now like to take the patient home with hospice care.    Is the patient hospice-eligible?   Yes  Was a discussion held re hospice services?   yes  Was a decision made re hospice services?  Yes             I spent 30 minutes in the professional and overall care of this patient.      Ana Baez, APRN-CNP

## 2024-04-24 NOTE — PROGRESS NOTES
PODIATRY SERVICE CONSULT PROGRESS NOTE    SERVICE DATE: 4/24/2024   SERVICE TIME:  1830    Subjective   INTERVAL HPI:   Pt was seen at bedside. Pain well controlled. Patient denies any constitutional symptoms.  No other pedal complaints. Family members at bedside awaiting discharge.    Medications:  Scheduled Meds: amLODIPine, 5 mg, oral, Daily  amoxicillin-pot clavulanate, 1 tablet, oral, BID  balsam peru-castor oiL, , Topical, BID  cranberry, 500 mg, oral, Daily  donepezil, 5 mg, oral, Daily  heparin (porcine), 5,000 Units, subcutaneous, q8h ESVIN  lactobacillus acidophilus, 2 tablet, oral, BID  [Held by provider] losartan, 50 mg, oral, Daily  traZODone, 50 mg, oral, Nightly      Continuous Infusions:    PRN Meds: PRN medications: acetaminophen, dextrose, dextrose, glucagon, glucagon, hydrALAZINE, ipratropium-albuteroL, polyethylene glycol, traMADol, vancomycin         Objective   PHYSICAL EXAM:  Physical Exam Performed:  Vitals:    04/24/24 1422   BP: 160/77   Pulse: 85   Resp: 18   Temp: 36.2 °C (97.2 °F)   SpO2: 94%     Body mass index is 14.63 kg/m².    Patient is AOx3 and in no acute distress. Patient is alert and cooperative. Sitting comfortably in bed with dressing clean, dry and intact. Heel off-loading boots in place B/L.  Objective:   Vasc: DP and PT pulses are faintly bilateral.  CFT is less than 3 seconds bilateral.  Skin temperature is warm to cool proximal to distal bilateral.  erythema to the dorsum of the foot      Neuro:  Light touch is intact to the foot bilateral.  Protective sensation is intact to the foot when tested with the 5.07 SWM bilateral.  There is no clonus noted.  The hallux is downgoing bilateral.       Derm: Skin is supple with normal texture and turgor noted.  Webspaces are clean, dry and intact bilateral.  There are no hyperkeratoses, ulcerations, verruca or other lesions noted.  Pressure ulceration of right heel with necrotic cap that is well adhered, no fluctuance underneath.  "Periwound erythema. Pressure ulceration of right lateral 5th toe and metatarsal area with necrotic cap that is well adhered with no fluctuance. Periwound erythema noted. Left calf has start of eccymosis.      Ortho: Muscle strength is 5/5 for all pedal groups tested.  Ankle joint, subtalar joint, 1st MPJ and lesser MPJ ROM is full and without pain or crepitus.  The foot type is rectus bilateral off weight bearing.  There are no structural deformities noted.    LABS:   Results for orders placed or performed during the hospital encounter of 04/19/24 (from the past 24 hour(s))   POCT GLUCOSE   Result Value Ref Range    POCT Glucose 112 (H) 74 - 99 mg/dL   Vancomycin   Result Value Ref Range    Vancomycin 19.5 5.0 - 20.0 ug/mL   POCT GLUCOSE   Result Value Ref Range    POCT Glucose 86 74 - 99 mg/dL   POCT GLUCOSE   Result Value Ref Range    POCT Glucose 138 (H) 74 - 99 mg/dL   POCT GLUCOSE   Result Value Ref Range    POCT Glucose 117 (H) 74 - 99 mg/dL      No results found for: \"HGBA1C\"   Lab Results   Component Value Date    CRP 8.74 (H) 04/19/2024      Lab Results   Component Value Date    SEDRATE 18 04/19/2024        Results from last 7 days   Lab Units 04/22/24  0627   WBC AUTO x10*3/uL 8.8   RBC AUTO x10*6/uL 4.08   HEMOGLOBIN g/dL 11.8*   HEMATOCRIT % 39.4     Results from last 7 days   Lab Units 04/23/24  0630 04/22/24  0627 04/21/24  0623   SODIUM mmol/L 134*   < > 140   POTASSIUM mmol/L 3.9   < > 4.2   CHLORIDE mmol/L 105   < > 110*   CO2 mmol/L 20*   < > 23   BUN mg/dL 33*   < > 43*   CREATININE mg/dL 1.57*   < > 1.68*   CALCIUM mg/dL 7.9*   < > 7.7*   PHOSPHORUS mg/dL  --   --  3.6   BILIRUBIN TOTAL mg/dL 0.9   < >  --    ALT U/L 12   < >  --    AST U/L 20   < >  --     < > = values in this interval not displayed.     Results from last 7 days   Lab Units 04/20/24  1646   COLOR U  Sandi*   APPEARANCE U  Hazy*   PH U  5.0   SPEC GRAV UR  1.025   PROTEIN U mg/dL 30 (1+)*   BLOOD UR  SMALL (1+)*   NITRITE U  " NEGATIVE   WBC UR /HPF >50*   BACTERIA UR /HPF 2+*       IMAGING REVIEW:  ECG 12 lead    Result Date: 4/24/2024  Sinus rhythm Multiform ventricular premature complexes Confirmed by Vj Alfaro ([id number]) on 4/24/2024 8:37:53 AM    XR chest 1 view    Result Date: 4/23/2024  Interpreted By:  Emile Adhikari, STUDY: XR CHEST 1 VIEW   INDICATION: Signs/Symptoms:Atelectasis/pneumonia.   COMPARISON: April 20, 2024   ACCESSION NUMBER(S): [id number]   ORDERING CLINICIAN: JANET ENNIS   FINDINGS: Bilateral pleural effusions and basilar edema slightly worsened when compared to prior study.   Cardiomegaly unchanged. No evidence of pneumothorax.       Bilateral pleural effusions and basilar edema slightly worsened when compared to prior study.   Signed by: Emile Adhikari 4/23/2024 7:11 PM Dictation workstation:   [id number]    Transthoracic Echo (TTE) Complete    Result Date: 4/23/2024    [facility], [address] [place] [address] [id number] [phone] and                                 Fax [phone] TRANSTHORACIC ECHOCARDIOGRAM REPORT  Patient Name:      SUZI ANGUIANOAUREA Gomez Physician:    [id number] Viridiana Bugros MD Study Date:        4/22/2024            Ordering Provider:    [id number] EMRE HOWE MRN/PID:           [id number]             Fellow: Accession#:        [id number]         Nurse: Date of Birth/Age: 11/25/1929 / 89      Sonographer:          Lynn sr                                      [facility] Gender:            F                    Additional Staff: Height:            157.48 cm            Admit Date:           4/20/2024 Weight:            36.29 kg             Admission Status:     Inpatient -                                                               Routine BSA / BMI:         1.29 m2 / 14.63      Encounter#:           [id number]                    kg/m2                                          Department Location:  [facility] Blood Pressure: 176 /80 mmHg Study Type:    TRANSTHORACIC ECHO (TTE) COMPLETE Diagnosis/ICD: Pleural Effusion-J90; Acute pulmonary edema-J81.0 Indication:    Elevated BNP CPT Code:      Echo Complete w Full [id number] Patient History: Pertinent History: HTN. Study Detail: The following Echo studies were performed: 2D, M-Mode, Doppler and               color flow. Technically challenging study due to patient lying in               supine position and body habitus.  PHYSICIAN INTERPRETATION: Left Ventricle: The left ventricular systolic function is normal, with an estimated ejection fraction of 60-65%. There are no regional wall motion abnormalities. The left ventricular cavity size is decreased. There is moderate concentric left ventricular hypertrophy. Spectral Doppler shows an impaired relaxation pattern of left ventricular diastolic filling. Left Atrium: The left atrium is normal in size. Right Ventricle: The right ventricle is normal in size. There is normal right ventricular global systolic function. Right Atrium: The right atrium is normal in size. Aortic Valve: The aortic valve is trileaflet. There is mild to moderate aortic valve thickening. There is evidence of mildly elevated transaortic gradients consistent with sclerosis of the aortic valve. There is no evidence of aortic valve regurgitation. The peak instantaneous gradient of the aortic valve is 5.2 mmHg. Mitral Valve: The mitral valve is normal in structure. There is no evidence of mitral valve regurgitation. Tricuspid Valve: The tricuspid valve is structurally normal. There is moderate to severe tricuspid regurgitation. The Doppler estimated RVSP is moderate to severely elevated at 55.7 mmHg. Pulmonic Valve: The pulmonic valve is structurally normal. There is physiologic pulmonic valve regurgitation. Pericardium: There is no pericardial effusion noted. Aorta: The aortic root is normal.  CONCLUSIONS:  1. Left  ventricular systolic function is normal with a 60-65% estimated ejection fraction.  2. Spectral Doppler shows an impaired relaxation pattern of left ventricular diastolic filling.  3. There is moderate concentric left ventricular hypertrophy.  4. Moderate to severe tricuspid regurgitation visualized.  5. Moderate to severely elevated right ventricular systolic pressure.  6. Aortic valve sclerosis.  7. Left ventricular cavity size is decreased. QUANTITATIVE DATA SUMMARY: 2D MEASUREMENTS:                          Normal Ranges: Ao Root d:     2.80 cm   (2.0-3.7cm) LAs:           2.80 cm   (2.7-4.0cm) IVSd:          1.48 cm   (0.6-1.1cm) LVPWd:         0.99 cm   (0.6-1.1cm) LVIDd:         2.77 cm   (3.9-5.9cm) LVIDs:         1.90 cm LV Mass Index: 79.3 g/m2 LV % FS        31.4 % LA VOLUME:                               Normal Ranges: LA Vol A4C:        32.6 ml    (22+/-6mL/m2) LA Vol A2C:        23.5 ml LA Vol BP:         27.9 ml LA Vol Index A4C:  25.2ml/m2 LA Vol Index A2C:  18.1 ml/m2 LA Vol Index BP:   21.6 ml/m2 LA Area A4C:       14.4 cm2 LA Area A2C:       12.1 cm2 LA Major Axis A4C: 5.4 cm LA Major Axis A2C: 5.3 cm LA Volume Index:   20.5 ml/m2 LA Vol A4C:        30.8 ml LA Vol A2C:        22.4 ml RA VOLUME BY A/L METHOD:                       Normal Ranges: RA Area A4C: 16.4 cm2 M-MODE MEASUREMENTS:                  Normal Ranges: Ao Root: 3.00 cm (2.0-3.7cm) LAs:     3.40 cm (2.7-4.0cm) AORTA MEASUREMENTS:                    Normal Ranges: Asc Ao, d: 3.10 cm (2.1-3.4cm) LV SYSTOLIC FUNCTION BY 2D PLANIMETRY (MOD):                     Normal Ranges: EF-A4C View: 60.1 % (>=55%) EF-A2C View: 67.6 % EF-Biplane:  61.7 % LV DIASTOLIC FUNCTION:                           Normal Ranges: MV Peak E:    0.39 m/s    (0.7-1.2 m/s) MV Peak A:    1.02 m/s    (0.42-0.7 m/s) E/A Ratio:    0.39        (1.0-2.2) MV lateral e' 0.08 m/s MV medial e'  0.05 m/s MV A Dur:     143.00 msec AORTIC VALVE:                         Normal  Ranges: AoV Vmax:      1.14 m/s (<=1.7m/s) AoV Peak P.2 mmHg (<20mmHg) LVOT Max Jama:  0.62 m/s (<=1.1m/s) LVOT VTI:      7.68 cm LVOT Diameter: 1.80 cm  (1.8-2.4cm) AoV Area,Vmax: 1.37 cm2 (2.5-4.5cm2)  RIGHT VENTRICLE: RV Basal 2.92 cm RV Mid   1.70 cm RV Major 5.5 cm TAPSE:   19.3 mm RV s'    0.10 m/s TRICUSPID VALVE/RVSP:                             Normal Ranges: Peak TR Velocity: 3.63 m/s RV Syst Pressure: 55.7 mmHg (< 30mmHg)  [id number] Viridiana Burgos MD Electronically signed on 2024 at 8:20:05 AM  ** Final **     US renal complete    Result Date: 2024  Interpreted By:  Landen Molina, STUDY: US RENAL COMPLETE;  2024 5:09 pm   INDICATION: Signs/Symptoms:Acute renal failure.   COMPARISON: None. 2011, 2013, and 2019 right upper quadrant ultrasound.   ACCESSION NUMBER(S): [id number]   ORDERING CLINICIAN: JANET ENNIS   TECHNIQUE: Multiple images of the kidneys were obtained  .   FINDINGS: Technologist notes that the examination is limited due to combination of factors including habitus, limited patient mobility, limited acoustic windows, overlying bowel gas and limited breath holding.   The right kidney measures 10 cm length without evident calculus or hydronephrosis. The left kidney is not identified with certainty. The urinary bladder is underdistended. The wall is not reliably evaluated. There is potential echogenic debris within the dependent portion the urinary bladder which is poorly evaluated.   There are features of cirrhosis and mild-to-moderate ascites.       No detected cause for renal insufficiency although limited examination.   The left kidney is not visualized.   Urinary bladder is underdistended potentially containing debris.   Cirrhosis and ascites.   MACRO: None   Signed by: Landen Molina 2024 1:11 PM Dictation workstation:   [id number]    MR foot right wo IV contrast    Result Date: 2024  Interpreted By:  Brent Giraldo, STUDY:  MRI of the  right foot without IV contrast;  4/20/2024 2:58 pm   INDICATION: Signs/Symptoms:pressure ulcerations right foot. Xr showed focal gas.   COMPARISON: None.   ACCESSION NUMBER(S): [id number]   ORDERING CLINICIAN: EDILSON ALEGRE   TECHNIQUE: MR imaging of the  right foot was obtained  without administration of intravenous contrast medium. The patient is limited by motion. Effusion consist were not able to be completed as the patient was unable to tolerate the exam.   FINDINGS: Skin and soft tissue defect present in the posterior aspect of the calcaneus. There is associated cellulitis. No definite marrow edema or loss of the T1 signal seen on these views.   Markedly limited evaluation of the forefoot due to motion. There is diffuse soft tissue edema in the forefoot especially dorsally. No discrete soft tissue collection seen.   No evidence of abnormal marrow edema or osteomyelitis by MRI in the 4th toe.   Moderate muscle in the plantar musculature suggestive of ischemic myopathy.   Evaluation of tendons or ligaments is markedly limited due to the limitations of the study. No severe tenosynovitis seen. There is no fracture in the visualized osseous structures       Study is limited and incomplete as the patient was unable to tolerate the exam.   1. Within this limitation no evidence of osteomyelitis in the posterior calcaneus or the 4th digit. Ulcers are present at these 2 locations with associated cellulitis and phlegmonous changes. If there is persistent clinical concern however repeat MR imaging with sedation can be performed 2. 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 This study was interpreted at [place], [place].   MACRO: None   Signed by: Brent Giraldo 4/21/2024 9:49 AM Dictation workstation:   [id number]    XR chest 1 view    Result Date: 4/20/2024  Interpreted By:   Leonel Paiz, STUDY: XR CHEST 1 VIEW; 4/20/2024 6:57 am   INDICATION: Signs/Symptoms:elevated bnp   COMPARISON: July 2011   ACCESSION NUMBER(S): [id number]   ORDERING CLINICIAN: ANGELY SERNA   FINDINGS: The study is limited due to rotation, respiratory motion and underpenetration. The cardiac silhouette appears enlarged, exaggerated by the technique. The aorta is ectatic and tortuous. Bilateral infiltrates and pleural effusions are present, right more than left. There is no pneumothorax. Degenerative changes involve the spine and shoulders.       Limited exam. Bibasilar infiltrates and pleural effusions, right more than left, possibly due to pulmonary edema or bilateral pneumonia. Correlate clinically and follow-up as needed.   Signed by: Leonel Paiz 4/20/2024 8:33 AM Dictation workstation:   [id number]    XR foot 3+ views bilateral    Result Date: 4/19/2024  Interpreted By:  Carol Briones, STUDY: XR FOOT 3+ VIEWS BILATERAL; ;  4/19/2024 7:13 pm   INDICATION: Signs/Symptoms:foot pain and blackened 5th toe area.   COMPARISON: None.   ACCESSION NUMBER(S): [id number]   ORDERING CLINICIAN: ROOSEVELT BEDOLLA   FINDINGS: Three views left foot: There is osteopenia. No acute fracture or malalignment. Mild midfoot degenerative changes. Mild diffuse soft tissue edema. No soft tissue gas or radiopaque foreign body.   Three views right foot: There is osteopenia. No acute fracture or malalignment. No osseous destruction or abnormal periosteal bone formation. Hallux valgus deformity and mild 1st metatarsophalangeal osteoarthrosis are noted. There is focal soft tissue gas involving the lateral aspect of the base of the 5th toe, suggesting ulceration. No radiopaque foreign body. Diffuse soft tissue edema noted.       No acute osseous abnormality.   Focal soft tissue gas involving the lateral base of the 5th toe suggesting ulceration. No radiographic evidence of osteomyelitis.   Additional chronic findings as above.    MACRO: None   Signed by: Carol Anali 4/19/2024 7:34 PM Dictation workstation:   [id number]            Assessment/Plan   ASSESSMENT & PLAN:    # Pressure ulceration unstageable, right heel  # Unstageable pressure ulceration, right lateral foot  # Unstageable pressuer ulceration, left heel   # DTI left calf  # Cellulitis, right foot  # PAD     -Patient was seen and evaluated; all findings were discussed and all questions were answered to patient's satisfaction.  - Charts, labs, vitals and imaging all reviewed.   - Imaging:  Read noted above. No evidence of osteomyelitis in the posterior calcaneus or the 4th digit  - Labs: reviewed  - PVRs: pending   - Abx:  vanc/zosyn     RECOMMENDATIONS:   - Continue with IV abx per primary, responding well  - MRI right foot reviewed, no OM seen on images captured. MRI not tolerated  - Currently no surgical intervention, due to family expectations and age, most likely will not elect surgery.   - Planned conservative treatment with daily dressing changes of betadine to keep wound dry and stable  - Dressings: betadine paint, covered with 4x4s, and paper tape  - Dressing orders placed.   - Prefer TruVue boots for offloading; however, patient's heels and legs can be offloaded with pillows until boots are available. Please make sure patients heels are floating and not in contact with bed to avoid further deep tissue injury  - will continue following while in house      Case to be discussed with attending, A&P above reflect tentative plan. Please await for final signature from attending physician on service.    Liborio Juan DPM PGY-2  Podiatric Medicine & Surgery  [place]         SIGNATURE: Liborio Juan DPM PATIENT NAME: Joann Blank   DATE: April 24, 2024 MRN: [id number]   TIME: 7:40 PM CONTACT: [employer] Message

## 2024-04-24 NOTE — CARE PLAN
The patient's goals for the shift include      The clinical goals for the shift include pt will remain comfortable and warm and dry    Over the shift, the patient did not make progress toward the following goals. Barriers to progression include altered mental status. Recommendations to address these barriers include pt family at bedside to assist in translation and feeding of pt.    Problem: Skin  Goal: Participates in plan/prevention/treatment measures  Outcome: Not Progressing  Goal: Prevent/manage excess moisture  Outcome: Not Progressing  Flowsheets (Taken 4/24/2024 1431)  Prevent/manage excess moisture: Follow provider orders for dressing changes  Goal: Prevent/minimize sheer/friction injuries  Outcome: Not Progressing     Problem: Pain  Goal: My pain/discomfort is manageable  Outcome: Not Progressing     Problem: Safety  Goal: Patient will be injury free during hospitalization  Outcome: Not Progressing  Goal: I will remain free of falls  Outcome: Not Progressing     Problem: Daily Care  Goal: Daily care needs are met  Outcome: Not Progressing

## 2024-04-24 NOTE — PROGRESS NOTES
"Vancomycin Dosing by Pharmacy- FOLLOW UP    Joann Blank is a 89 y.o. year old female who Pharmacy has been consulted for vancomycin dosing for cellulitis, skin and soft tissue. Based on the patient's indication and renal status this patient is being dosed based on a goal AUC of 400-600.     Renal function is currently declining.    Current vancomycin dose: 1000 mg given every once. Dosing by levels     Most recent trough level: 20 mcg/mL    Visit Vitals  /55   Pulse 90   Temp 35.9 °C (96.6 °F)   Resp 18        Lab Results   Component Value Date    CREATININE 1.57 (H) 04/23/2024    CREATININE 1.47 (H) 04/22/2024    CREATININE 1.68 (H) 04/21/2024    CREATININE 1.81 (H) 04/20/2024        Patient weight is No results found for: \"PTWEIGHT\"    No results found for: \"CULTURE\"     I/O last 3 completed shifts:  In: 1120 (30.9 mL/kg) [P.O.:720; IV Piggyback:400]  Out: 1700 (46.8 mL/kg) [Urine:1700 (1.3 mL/kg/hr)]  Dosing Weight: 36.3 kg   [unfilled]    No results found for: \"PATIENTTEMP\"     Assessment/Plan    Within goal random/trough level    This dosing regimen is predicted by [employer] to result in the following pharmacokinetic parameters:  Pharmacy did not dose 4/23 PM because trough was [20] and CrCl is 12.6 ml/min.     The next level will be obtained on 4/24 at 2100. May be obtained sooner if clinically indicated.   Will continue to monitor renal function daily while on vancomycin and order serum creatinine at least every 48 hours if not already ordered.  Follow for continued vancomycin needs, clinical response, and signs/symptoms of toxicity.       Arian Weller, PharmD           "

## 2024-04-24 NOTE — DISCHARGE SUMMARY
Discharge Diagnosis  Cellulitis of foot    Issues Requiring Follow-Up      Discharge Meds     Your medication list        START taking these medications        Instructions Last Dose Given Next Dose Due   amLODIPine 5 mg tablet  Commonly known as: Norvasc  Start taking on: April 25, 2024      Take 1 tablet (5 mg) by mouth once daily.       amoxicillin-pot clavulanate 875-125 mg tablet  Commonly known as: Augmentin      Take 1 tablet by mouth 2 times a day for 14 doses.              CONTINUE taking these medications        Instructions Last Dose Given Next Dose Due   cranberry 500 mg capsule           donepezil 5 mg tablet  Commonly known as: Aricept           losartan 50 mg tablet  Commonly known as: Cozaar           traZODone 50 mg tablet  Commonly known as: Desyrel                     Where to Get Your Medications        You can get these medications from any pharmacy    Bring a paper prescription for each of these medications  amLODIPine 5 mg tablet  amoxicillin-pot clavulanate 875-125 mg tablet         Test Results Pending At Discharge  Pending Labs       No current pending labs.            Hospital Course    89 y.o. female presenting to emergency department for evaluation of a wound on the base of the fifth metatarsal.  The patient is Nepalese speaking and presents with her son and granddaughter.  They state that they have been applying some topical gauze and ointment.  Patient also has a small lesion on her right heel and another lesion on her left foot near the first metatarsal.  Family became concerned so they brought her in for evaluation.  They state that the patient has seen her PCP approximately 1 month ago and did not have these issues.  She had a urinary tract infection in the right foot infection as well as pneumonia.  Felt that she should probably be on Augmentin and her 75 mg twice daily over a 7-day course for treatment when and treated her with that when it with her other medications as well  continue with all present care and therapy as well as home health care she needed made arrangements for that as well on the day of her discharge she was stable and when discharged her in stable condition discharge day management greater than 30 minutes total time thank you    Pertinent Physical Exam At Time of Discharge  Physical Exam    Outpatient Follow-Up  No future appointments.      Anshul Morris DO

## 2024-05-02 NOTE — DOCUMENTATION CLARIFICATION NOTE
"    PATIENT:               SUZI COLLINS  ACCT #:                  [id number]  MRN:                       [id number]  :                       [id number]  ADMIT DATE:       2024 6:15 PM  DISCH DATE:        2024 8:26 PM  RESPONDING PROVIDER #:        [id number]          PROVIDER RESPONSE TEXT:    Acute on Chronic Systolic Congestive Heart Failure    CDI QUERY TEXT:    Clarification    Instruction:    Based on your assessment of the patient and the clinical information, please provide the requested documentation by clicking on the appropriate radio button and enter any additional information if prompted.    Question: Please further clarify the type and acuity of congestive heart failure    When answering this query, please exercise your independent professional judgment. The fact that a question is being asked, does not imply that any particular answer is desired or expected.    The patient's clinical indicators include:  Clinical Information 89 yo presenting with multiple medical conditions including pneumonia    24 BNP 1113    Clinical Indicators PN 24 Dr. oMrris  \"multiple multiple different issues involved right now from this pneumonia to the cellulitis\"    24  Chest Xray  \"Limited exam. Bibasilar infiltrates and pleural effusions, right more than left, possibly due to pulmonary edema or bilateral pneumonia\"    24 Dr. Downey  \"Patient's pleural effusions are chronic and likely related to heart failure\"    24 Dr. Burgos  \"Elevated BNP pleural effusions.  Echo reviewed.  Small left-ventricular cavity moderate LVH diastolic dysfunction.  EF 65%.  Moderate to severe pulmonary hypertension was noted.  Dose of Lasix.  Management of HFrEF would include addition of an SGLT2 inhibitor.  With her age, and conservative status and family wanting palliative care, I will withhold that.  Dose of Lasix\"    Treatment  IV Zosyn, IV Vancomycin, supplemental oxygen, IV lasix, ECHO, nebulizer " treatments    Risk Factors  89 yo with dysphagia, pneumonia, frailty, BMI 14.63  Options provided:  -- Acute Systolic Congestive Heart Failure  -- Acute on Chronic Systolic Congestive Heart Failure  -- Chronic Systolic Congestive Heart Failure  -- Acute Diastolic Congestive Heart Failure  -- Acute on Chronic Diastolic Congestive Heart Failure  -- Chronic Diastolic Congestive Heart Failure  -- Acute combined systolic/diastolic Congestive Heart Failure  -- Acute on Chronic combined systolic/diastolic Congestive Heart Failure  -- Chronic combined systolic/diastolic Congestive Heart Failure  -- Other - I will add my own diagnosis  -- Refer to Clinical Documentation Reviewer    Query created by: Marla Alejandre on 4/24/2024 6:11 AM      Electronically signed by:  NICCI NYE DO 5/2/2024 12:24 PM
